# Patient Record
Sex: FEMALE
[De-identification: names, ages, dates, MRNs, and addresses within clinical notes are randomized per-mention and may not be internally consistent; named-entity substitution may affect disease eponyms.]

---

## 2018-07-11 ENCOUNTER — HOSPITAL ENCOUNTER (INPATIENT)
Dept: HOSPITAL 25 - SSU | Age: 78
LOS: 13 days | Discharge: NURSING, REHAB OR LONG-TERM CARE | DRG: 841 | End: 2018-07-24
Attending: INTERNAL MEDICINE | Admitting: INTERNAL MEDICINE
Payer: MEDICARE

## 2018-07-11 DIAGNOSIS — Z91.19: ICD-10-CM

## 2018-07-11 DIAGNOSIS — I48.91: ICD-10-CM

## 2018-07-11 DIAGNOSIS — R29.6: ICD-10-CM

## 2018-07-11 DIAGNOSIS — F05: ICD-10-CM

## 2018-07-11 DIAGNOSIS — E87.6: ICD-10-CM

## 2018-07-11 DIAGNOSIS — Z79.891: ICD-10-CM

## 2018-07-11 DIAGNOSIS — R09.02: ICD-10-CM

## 2018-07-11 DIAGNOSIS — Z88.8: ICD-10-CM

## 2018-07-11 DIAGNOSIS — R34: ICD-10-CM

## 2018-07-11 DIAGNOSIS — Z87.891: ICD-10-CM

## 2018-07-11 DIAGNOSIS — G93.89: ICD-10-CM

## 2018-07-11 DIAGNOSIS — S70.11XA: ICD-10-CM

## 2018-07-11 DIAGNOSIS — Z66: ICD-10-CM

## 2018-07-11 DIAGNOSIS — D49.89: ICD-10-CM
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
## 2018-07-11 DIAGNOSIS — Z86.73: ICD-10-CM

## 2018-07-11 DIAGNOSIS — F03.90: ICD-10-CM

## 2018-07-11 DIAGNOSIS — Z90.49: ICD-10-CM

## 2018-07-11 DIAGNOSIS — R59.0: ICD-10-CM

## 2018-07-11 DIAGNOSIS — Z98.51: ICD-10-CM

## 2018-07-11 DIAGNOSIS — M84.459A: ICD-10-CM

## 2018-07-11 DIAGNOSIS — M25.511: ICD-10-CM

## 2018-07-11 DIAGNOSIS — E87.70: ICD-10-CM

## 2018-07-11 DIAGNOSIS — Z72.89: ICD-10-CM

## 2018-07-11 DIAGNOSIS — C82.94: Primary | ICD-10-CM
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
## 2018-07-11 DIAGNOSIS — I10: ICD-10-CM

## 2018-07-11 DIAGNOSIS — E11.9: ICD-10-CM

## 2018-07-11 DIAGNOSIS — K59.00: ICD-10-CM

## 2018-07-11 DIAGNOSIS — E83.42: ICD-10-CM

## 2018-07-11 DIAGNOSIS — J44.9: ICD-10-CM

## 2018-07-11 DIAGNOSIS — E27.8: ICD-10-CM

## 2018-07-11 LAB
BASOPHILS # BLD AUTO: 0.1 10^3/UL (ref 0–0.2)
EOSINOPHIL # BLD AUTO: 0.1 10^3/UL (ref 0–0.6)
HCT VFR BLD AUTO: 38 % (ref 35–47)
HGB BLD-MCNC: 12.4 G/DL (ref 12–16)
INR PPP/BLD: 0.94 (ref 0.77–1.02)
LYMPHOCYTES # BLD AUTO: 0.5 10^3/UL (ref 1–4.8)
MCH RBC QN AUTO: 28 PG (ref 27–31)
MCHC RBC AUTO-ENTMCNC: 33 G/DL (ref 31–36)
MCV RBC AUTO: 84 FL (ref 80–97)
MONOCYTES # BLD AUTO: 0.8 10^3/UL (ref 0–0.8)
NEUTROPHILS # BLD AUTO: 10.2 10^3/UL (ref 1.5–7.7)
NRBC # BLD AUTO: 0 10^3/UL
NRBC BLD QL AUTO: 0
PLATELET # BLD AUTO: 316 10^3/UL (ref 150–450)
RBC # BLD AUTO: 4.51 10^6/UL (ref 4–5.4)
WBC # BLD AUTO: 11.7 10^3/UL (ref 3.5–10.8)

## 2018-07-11 PROCEDURE — 72170 X-RAY EXAM OF PELVIS: CPT

## 2018-07-11 PROCEDURE — 71260 CT THORAX DX C+: CPT

## 2018-07-11 PROCEDURE — 72192 CT PELVIS W/O DYE: CPT

## 2018-07-11 PROCEDURE — 70498 CT ANGIOGRAPHY NECK: CPT

## 2018-07-11 PROCEDURE — 81003 URINALYSIS AUTO W/O SCOPE: CPT

## 2018-07-11 PROCEDURE — 88188 FLOWCYTOMETRY/READ 9-15: CPT

## 2018-07-11 PROCEDURE — 80048 BASIC METABOLIC PNL TOTAL CA: CPT

## 2018-07-11 PROCEDURE — 70551 MRI BRAIN STEM W/O DYE: CPT

## 2018-07-11 PROCEDURE — 88184 FLOWCYTOMETRY/ TC 1 MARKER: CPT

## 2018-07-11 PROCEDURE — 87641 MR-STAPH DNA AMP PROBE: CPT

## 2018-07-11 PROCEDURE — 93005 ELECTROCARDIOGRAM TRACING: CPT

## 2018-07-11 PROCEDURE — 88189 FLOWCYTOMETRY/READ 16 & >: CPT

## 2018-07-11 PROCEDURE — 36415 COLL VENOUS BLD VENIPUNCTURE: CPT

## 2018-07-11 PROCEDURE — 88360 TUMOR IMMUNOHISTOCHEM/MANUAL: CPT

## 2018-07-11 PROCEDURE — 88173 CYTOPATH EVAL FNA REPORT: CPT

## 2018-07-11 PROCEDURE — 88185 FLOWCYTOMETRY/TC ADD-ON: CPT

## 2018-07-11 PROCEDURE — 71045 X-RAY EXAM CHEST 1 VIEW: CPT

## 2018-07-11 PROCEDURE — 80053 COMPREHEN METABOLIC PANEL: CPT

## 2018-07-11 PROCEDURE — 88187 FLOWCYTOMETRY/READ 2-8: CPT

## 2018-07-11 PROCEDURE — 74177 CT ABD & PELVIS W/CONTRAST: CPT

## 2018-07-11 PROCEDURE — 87040 BLOOD CULTURE FOR BACTERIA: CPT

## 2018-07-11 PROCEDURE — 85610 PROTHROMBIN TIME: CPT

## 2018-07-11 PROCEDURE — 85025 COMPLETE CBC W/AUTO DIFF WBC: CPT

## 2018-07-11 PROCEDURE — 88172 CYTP DX EVAL FNA 1ST EA SITE: CPT

## 2018-07-11 PROCEDURE — 70496 CT ANGIOGRAPHY HEAD: CPT

## 2018-07-11 PROCEDURE — 93306 TTE W/DOPPLER COMPLETE: CPT

## 2018-07-11 PROCEDURE — 83735 ASSAY OF MAGNESIUM: CPT

## 2018-07-11 PROCEDURE — 88305 TISSUE EXAM BY PATHOLOGIST: CPT

## 2018-07-11 PROCEDURE — 99223 1ST HOSP IP/OBS HIGH 75: CPT

## 2018-07-11 PROCEDURE — 99231 SBSQ HOSP IP/OBS SF/LOW 25: CPT

## 2018-07-11 RX ADMIN — DOCUSATE SODIUM SCH MG: 100 CAPSULE, LIQUID FILLED ORAL at 21:30

## 2018-07-11 RX ADMIN — KETOROLAC TROMETHAMINE PRN MG: 15 INJECTION, SOLUTION INTRAMUSCULAR; INTRAVENOUS at 17:49

## 2018-07-11 RX ADMIN — INSULIN LISPRO SCH UNIT: 100 INJECTION, SOLUTION INTRAVENOUS; SUBCUTANEOUS at 21:27

## 2018-07-11 RX ADMIN — MORPHINE SULFATE PRN MG: 4 INJECTION INTRAVENOUS at 23:01

## 2018-07-11 RX ADMIN — HYDROMORPHONE HYDROCHLORIDE PRN MG: 1 INJECTION, SOLUTION INTRAMUSCULAR; INTRAVENOUS; SUBCUTANEOUS at 23:24

## 2018-07-11 RX ADMIN — MORPHINE SULFATE PRN MG: 4 INJECTION INTRAVENOUS at 17:47

## 2018-07-11 RX ADMIN — MORPHINE SULFATE PRN MG: 4 INJECTION INTRAVENOUS at 20:24

## 2018-07-11 RX ADMIN — HEPARIN SODIUM SCH UNITS: 5000 INJECTION INTRAVENOUS; SUBCUTANEOUS at 21:28

## 2018-07-11 NOTE — RAD
Indication: Shortness of breath. RIGHT femoral neck fracture



Comparison: No relevant prior exams available on the [facility] PACS for comparison.



Technique: Supine portable chest 1726 hours



Report: Elevated lung volumes and both diffuse mild prominence of the interstitial

markings and patchy rarefaction of the mid to upper lung zone interstitial markings.  No

pulmonary infiltrate. No pleural effusions or pneumothorax evident within limits of supine

technique. The heart, pulmonary vasculature, and mediastinal contours are unremarkable.

Hand superimposed at the epigastric region. Surgical clips at the level of the gallbladder

fossa.



IMPRESSION: Stigmata of obstructive lung disease. No acute pulmonary or cardiac process

evident.

## 2018-07-11 NOTE — RAD
Indication: RIGHT hip pain. Question hip fracture.



Comparison: No relevant prior exams available on the [facility] [place] for comparison.



Technique: AP frog-leg pelvis. AP and crosstable lateral views RIGHT femur.



Report: The RIGHT hip is normally located. No cortical disruption or gross trabecular

irregularity evident to identify fracture. Assessment is limited as there is no AP view of

the femoral head and neck. The remainder of the RIGHT femur is negative for fracture. Bone

density appears decreased throughout. Moderate axial joint space narrowing and medial

subchondral sclerosis and cystic change at the RIGHT hip. 



IMPRESSION: 

#. Limiting assessment for fracture given absence of an AP view of the RIGHT femoral head

and neck. Consider repeat radiographic exam or CT for further assessment if deemed

appropriate.

## 2018-07-11 NOTE — HP
CC:  Dr. Pfeiffer *

 

ADMISSION HISTORY AND PHYSICAL:

 

DATE OF ADMISSION:  07/11/18

 

PATIENT OF:  Obi Jose MD

 

ATTENDING HOSPITALIST:  Dr. Jose.* (DICTATED BY RIMA PORTER)

 

CONSULTING ORTHOPEDIST:  Juana Pfeiffer MD

 

CHIEF COMPLAINT:  Right hip pain.

 

HISTORY OF PRESENT ILLNESS:  Mrs. Lay is a 78-year-old female who 
apparently has a past medical history significant for hypertension, diabetes 
mellitus, and COPD, who was transferred from [facility] to [facility] earlier today after she sustained a fall at her residence at the 
nursing home.  The patient apparently sustained multiple falls where she 
resides at [facility].  I have on record two 
different pelvis 

x-rays dated from late May and most recent one yesterday that revealed a 
displaced right femoral fracture.  The patient unfortunately has been suffering 
from a longstanding history of dementia and she could not provide me with any 
significant history at this time.  I have tried to contact her healthcare proxy 
carrier, her daughter, Asya.  Unfortunately, 

I have not been able to get a hold of her over the phone.  According to the 
record I have from [facility], the patient was evaluated due to 
significant complaints of right hip pain after she sustained a fall yesterday 
morning.  She was evaluated and had hip x-ray that revealed displaced femoral 
neck fracture.  The patient was evaluated at [facility] initially and 
then contact was made to [facility] provider, who accepted the transfer of the patient 
in anticipation for probable orthopedic intervention to fix her broken hip.  
She appears to be in pain upon presentation and unfortunately does not provide 
me with any other significant medical history.  She denies any chest pain or 
shortness of breath.

 

PAST MEDICAL HISTORY:  Significant for hypertension, insulin-dependent diabetes 
mellitus, and remote history of COPD, which apparently she has not been using 
any nebulizer due to prior allergy of ALBUTEROL.  She also has history of 
advanced dementia and TIA long time ago.

 

PAST SURGICAL HISTORY:  Significant for tubal ligation and cholecystectomy.

 

CURRENT MEDICATIONS:  According to [facility] records, her medication include:

1.  Acetaminophen 650 mg p.o. q.6 hours as needed for fever or pain.

2.  Norvasc 10 mg p.o. daily.

3.  Dulcolax 10 mg suppository once per rectum daily as needed for constipation.

4.  Insulin regular.

5.  Humalog per sliding scale q.a.c. and q.h.s.

6.  Toradol 15 mg injection IM q. day as needed for pain.

7.  Melatonin 1 mg p.o. q.h.s. as needed for insomnia.

8.  Metformin 750 mg p.o. daily.

9.  Milk of magnesia 15 mL p.o. p.r.n. for constipation.

10.  Norco 5/325 one tablet p.o. q.4 hours as needed for pain.

 

ALLERGIES:  According to record, she is allergic to ALBUTEROL.

 

SOCIAL HISTORY:  The patient was living independently at [facility] until 
her recent admission to [facility]. She is a former 
smoker who smoked for many decades and quit a long time.  She drinks alcohol 
occasionally.  Again, per nursing home record, her healthcare proxy is her 
daughter, Asya, and her home phone number is area code [phone], [phone].

 

REVIEW OF SYSTEMS:  Unable to obtain due to advanced dementia and very minimal 
verbal contact.

 

                               PHYSICAL EXAMINATION

 

GENERAL:  She is a frail, elderly female, appears uncomfortable with complaints 
of right hip pain, but in no acute distress at the time of admission.

 

VITAL SIGNS:  There is no set of vitals available at the time of admission, but 
we will follow her up closely once stable.

 

HEENT:  Head is normocephalic, atraumatic.  Sclerae anicteric.  PERRLA.  EOMs 
intact.  Oropharynx is pink and moist.

 

NECK:  Supple.  Trachea midline.  No cervical adenopathy, thyromegaly, or JVD.

 

LUNGS:  Clear to auscultation bilaterally.

 

HEART:  Regular rate and rhythm.  Normal S1 and S2 without rubs, murmurs, or 
gallops.

 

BACK:  With normal curvature and no CVA tenderness.

 

ABDOMEN:  Soft, nontender, and nondistended.  No hernias, masses, or 
hepatosplenomegaly.

 

BREAST EXAM:  Deferred at this time.

 

EXTREMITIES:  Without cyanosis, clubbing, or edema.  Examination of the right 
lower extremity revealed a slightly shorter lower extremity on the right side 
that appears to be internally rotated as well.  There is a significant 
tenderness on minimal palpation to the greater trochanteric area.  There is no 
visible ecchymosis or swelling noted.  Right shoulder with an area of 
ecchymosis on top of humeral head and AC joint.  There is minimal tenderness on 
palpation and no sensation of bony injuries.

 

RECTAL:  Exam deferred at this time.

 

NEUROLOGIC:  She is awake and alert at times, also appears drowsy at other 
times. She is not oriented to place, time, but occasionally oriented to self.

 

 LABORATORY WORKUP:  CBC, CMP, PT, INR, and UA is pending at the time of 
admission.

 

ACCESSORY DIAGNOSTIC DATA:  Pelvic x-rays, right hip x-rays, portable chest as 
well as EKG are all pending at the time of admission.

 

IMPRESSION:  A 78-year-old female with past medical history of hypertension, 
COPD, and insulin-dependent diabetes mellitus, who sustained multiple falls at 
her residence at [facility] most recently yesterday, 
who was found to have a displaced right femoral neck fracture.

 

ASSESSMENT AND PLAN:

1.  Right femoral neck fracture.  The patient will be admitted under 
hospitalist services to surgical stay unit for pain control.  She will be seen 
by Dr. Pfeiffer for Orthopedic consultation.  We will do her workup regarding 
labs and accessory diagnostic data as well as probable echocardiogram in the 
morning for further assessment in anticipation to surgical intervention in the 
near future.  I will attempt again to call her daughter regarding plans of care 
since the patient has not given me any clear answer whether she wants to 
proceed with any intervention at this time or not.

2.  Hypertension.  We will continue her amlodipine.

3.  Chronic obstructive pulmonary disease.  Appears stable and good oxygen 
saturation.  We will continue to monitor.

4.  Insulin-dependent diabetes mellitus.  We will continue to cover her with 
sliding scale with fingersticks q.a.c. and q.h.s.

5.  DVT prophylaxis.  She is at highest risk given her age and immobility.  We 
will cover with subcu heparin as well as SCDs.

6.  Code status:  For the time being and according to record, she is a full 
code and we will visit this issue again once I get in touch with her daughter.

 

 ____________________________________ RIMA PORTER

 

[id number]/CPS #: [id number]

[email or web address]

## 2018-07-11 NOTE — PN
Progress Note





- Progress Note


Date of Service: 07/11/18


Note: 





Full note dictated in system prior to obtaining CT scan. The CT demonstrates a 

displaced femoral neck fracture. This would most appropriately be treated by a 

natalya arthroplasty. Will discuss with my colleagues to help facilitate surgery 

when she is medically optimized. NPO after midnight.

## 2018-07-11 NOTE — RAD
Indication: RIGHT hip pain. Question hip fracture.



Comparison: No relevant prior exams available on the [facility] PACS for comparison.



Technique: AP frog-leg pelvis. AP and crosstable lateral views RIGHT femur.



Report: The RIGHT hip is normally located. No cortical disruption or gross trabecular

irregularity evident to identify fracture. Assessment is limited as there is no AP view of

the femoral head and neck. The remainder of the RIGHT femur is negative for fracture. Bone

density appears decreased throughout. Moderate axial joint space narrowing and medial

subchondral sclerosis and cystic change at the RIGHT hip. 



IMPRESSION: 

#. Limiting assessment for fracture given absence of an AP view of the RIGHT femoral head

and neck. Consider repeat radiographic exam or CT for further assessment if deemed

appropriate.

## 2018-07-12 LAB
BASOPHILS # BLD AUTO: 0 10^3/UL (ref 0–0.2)
EOSINOPHIL # BLD AUTO: 0 10^3/UL (ref 0–0.6)
HCT VFR BLD AUTO: 36 % (ref 35–47)
HGB BLD-MCNC: 11.8 G/DL (ref 12–16)
INR PPP/BLD: 0.94 (ref 0.77–1.02)
LYMPHOCYTES # BLD AUTO: 0.5 10^3/UL (ref 1–4.8)
MCH RBC QN AUTO: 28 PG (ref 27–31)
MCHC RBC AUTO-ENTMCNC: 33 G/DL (ref 31–36)
MCV RBC AUTO: 84 FL (ref 80–97)
MONOCYTES # BLD AUTO: 0.7 10^3/UL (ref 0–0.8)
NEUTROPHILS # BLD AUTO: 8.6 10^3/UL (ref 1.5–7.7)
NRBC # BLD AUTO: 0 10^3/UL
NRBC BLD QL AUTO: 0.1
PLATELET # BLD AUTO: 319 10^3/UL (ref 150–450)
RBC # BLD AUTO: 4.28 10^6/UL (ref 4–5.4)
WBC # BLD AUTO: 9.9 10^3/UL (ref 3.5–10.8)

## 2018-07-12 RX ADMIN — DOCUSATE SODIUM SCH: 100 CAPSULE, LIQUID FILLED ORAL at 07:12

## 2018-07-12 RX ADMIN — SODIUM CHLORIDE SCH MLS/HR: 900 IRRIGANT IRRIGATION at 10:12

## 2018-07-12 RX ADMIN — INSULIN LISPRO SCH: 100 INJECTION, SOLUTION INTRAVENOUS; SUBCUTANEOUS at 12:37

## 2018-07-12 RX ADMIN — INSULIN LISPRO SCH: 100 INJECTION, SOLUTION INTRAVENOUS; SUBCUTANEOUS at 07:27

## 2018-07-12 RX ADMIN — HYDROMORPHONE HYDROCHLORIDE PRN MG: 1 INJECTION, SOLUTION INTRAMUSCULAR; INTRAVENOUS; SUBCUTANEOUS at 10:59

## 2018-07-12 RX ADMIN — INSULIN LISPRO SCH: 100 INJECTION, SOLUTION INTRAVENOUS; SUBCUTANEOUS at 23:58

## 2018-07-12 RX ADMIN — AMLODIPINE BESYLATE SCH: 5 TABLET ORAL at 08:26

## 2018-07-12 RX ADMIN — INSULIN LISPRO SCH: 100 INJECTION, SOLUTION INTRAVENOUS; SUBCUTANEOUS at 16:26

## 2018-07-12 RX ADMIN — HEPARIN SODIUM SCH: 5000 INJECTION INTRAVENOUS; SUBCUTANEOUS at 06:18

## 2018-07-12 RX ADMIN — DOCUSATE SODIUM SCH: 100 CAPSULE, LIQUID FILLED ORAL at 21:16

## 2018-07-12 RX ADMIN — INSULIN LISPRO SCH: 100 INJECTION, SOLUTION INTRAVENOUS; SUBCUTANEOUS at 17:50

## 2018-07-12 RX ADMIN — HYDROMORPHONE HYDROCHLORIDE PRN MG: 1 INJECTION, SOLUTION INTRAMUSCULAR; INTRAVENOUS; SUBCUTANEOUS at 21:20

## 2018-07-12 RX ADMIN — HYDROMORPHONE HYDROCHLORIDE PRN MG: 1 INJECTION, SOLUTION INTRAMUSCULAR; INTRAVENOUS; SUBCUTANEOUS at 17:56

## 2018-07-12 NOTE — RAD
Indication: Worsening confusion and weakness. Previous CVAs with residual RIGHT-sided

weakness.



Comparison: CT and CTA head neck exams of the same date.



Technique: [employer] 1.5 Hallie MN819E with GEM suite.  MRI brain without contrast. 



Report: Diffusion series is negative for acute or subacute ischemia. Susceptibility series

is negative for stigmata of hemosiderin deposition to indicate previous hemorrhage.



Mild prominence of the cerebral sulci and cerebellar fissures collecting atrophy.

Proportional ventricular enlargement. Patent basal cisterns. Disproportionate mild volume

loss and T2 hyperintense ischemic gliosis at the LEFT middle cerebral artery distribution

of the frontal and parietal lobes consistent with sequela of previous infarct. Additional

few nonspecific T2 hyperintense foci within the cerebral white matter without mass effect.

No extra-axial fluid collection evident. Preserved major intracranial flow-voids.

Unremarkable orbital contents.



No suspicious calvarial or skull base lesions evident. Mucous retention cyst or polyp at

the inferior LEFT maxillary sinus. Unremarkable scalp. 



IMPRESSION: 

#. Negative for stigmata of acute or subacute ischemia. 

#. Relative mild encephalomalacia related to old LEFT MCA distribution infarct involving

the posterior LEFT frontal lobe and parietal lobe. 

#. Mild diffuse atrophy and stigmata of chronic small vessel ischemic disease.

## 2018-07-12 NOTE — RAD
INDICATION: RIGHT femoral neck fracture. Assess for tumor.



COMPARISON: July 11, 2018 noncontrast CT pelvis.



TECHNIQUE: Multidetector CT images were obtained from the lung apices to the ischial

tuberosities with 64 mL Visipaque 320 IV contrast. No oral contrast administered.



CHEST REPORT: Advanced emphysema. Small dependent RIGHT and trace dependent LEFT pleural

effusions with proportional atelectasis. Negative for pneumothorax.



LEFT axillary lymphadenopathy with coalescent janis mass measuring up to 3.2 x 5.2 cm.

Negative for mediastinal or hilar lymphadenopathy. Negative for cardiomegaly or

pericardial effusion. Atherosclerotic plaque of the normal diameter thoracic aorta.

Negative for arterial dissection.



Ill-defined lobular soft tissue hematoma involving the RIGHT trapezius muscle at the

superior posterior margin of the shoulder and probable additional tumor involving the

RIGHT infraspinatus and teres minor muscles.



Ill-defined sclerotic lesion at the T5 vertebral body. Similar ill-defined sclerotic

lesion at the T10 vertebral body at the RIGHT margin. Osteoblastic lesion at the LEFT

third rib laterally.



CHEST IMPRESSION: 

#. Small RIGHT and trace LEFT pleural effusions with proportional atelectasis. 

#. LEFT axillary lymphadenopathy. 

#.  Ill-defined lobular soft tissue hematoma involving the RIGHT trapezius muscle at the

superior posterior margin of the shoulder and probable additional tumor involving the

RIGHT infraspinatus and teres minor muscles. 

#. T5, T10, and LEFT third rib osteoblastic lesions.



ABDOMEN PELVIS REPORT: Post cholecystectomy. Mild intrahepatic biliary dilatation.

Negative for dilatation of the common bile duct. Negative for focal hepatic lesions.

Atrophic pancreas. Negative for pancreatic duct dilatation. Unremarkable spleen.



No CT abnormality of the upper GI or small bowel. The appendix is not visualized.

Unremarkable colon. Gas distention of the rectum. Small volume of free fluid in the

cul-de-sac. Negative for free air or hernias.



3.1 x 2.1 cm soft tissue density RIGHT adrenal mass. Negative for focal LEFT adrenal

lesions. Negative for suspicious focal renal lesions or hydronephrosis. Small cortical

cyst at the inferior pole of the LEFT kidney. The ureters are obscured due to

retroperitoneal lymphadenopathy. No ureteral dilatation evident. Catheterized largely

decompressed urinary bladder without suspicious finding. Anteverted uterus. 3 cm mildly

hyperdense lesion at the fundus of the uterus most suspicious for a fibroid. No ovarian

lesions evident.



Extensive retroperitoneal lymphadenopathy primarily LEFT para-aortic and external iliac

with coalescent adenopathy inseparable from an enlarged LEFT iliopsoas muscle. Coalescent

janis mass and LEFT psoas muscle measures up to 6.4 cm AP by 5.6 cm transverse at the

pelvic inlet compared with 2.9 x 2.5 cm for the normal-appearing RIGHT psoas muscle.

Dominant RIGHT common femoral lymph node measures 2 cm short axis.



Extensive atherosclerotic plaque at the abdominal aorta and iliac arteries. Borderline

mild fusiform aneurysm of the infrarenal abdominal aorta measuring up to 2.3 cm diameter

compared with the more proximal segment of the abdominal aorta which measures only 1.3 cm

diameter.



Subcapital RIGHT femoral neck fracture with apex anterior and varus angulation.

Osteoblastic lesion at the RIGHT ischial tuberosity with flanking soft tissue density mass

extending both medially and laterally from the ischium.



ABDOMEN PELVIS IMPRESSION: 

#. Nonspecific 3.1 cm RIGHT adrenal mass suspicious for metastasis. 

#. Negative for obstructive uropathy. 

#. Extensive retroperitoneal lymphadenopathy as described. 

#. 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 

#. Subcapital RIGHT femoral neck fracture with apex anterior and varus angulation.

Osteoblastic lesion at the RIGHT ischial tuberosity with flanking soft tissue density mass

extending both medially and laterally from the ischium. 

#. Consider fine-needle aspiration of the dominant RIGHT common femoral level lymph node

for histopathologic assessment.

## 2018-07-12 NOTE — CONS
CC:  PCP, Héctor Dumas MD *

 

CONSULTATION REPORT:

 

DATE OF CONSULT:  07/11/18

 

CHIEF COMPLAINT:  Right hip pain.

 

HISTORY OF PRESENT ILLNESS:  Briefly, this H and P is obtained from [facility] admission history and physical from 05/24/18.  Briefly
, Cici Lay is a [facility] resident with advanced 
dementia, who presents with concerns for right hip fracture.  She was 
transferred here.  We have no imaging, no history other than this document.  
She is nonverbal and noncompliant.  She is having difficulty with ambulation 
and altered mental status.  I do not have any documents to tell me if she 
ambulates with an assistive device.  She has family, apparently is not able to 
be reached today.  She has right hip pain and has pain with moving the hip.  
She keeps it flexed and does not move it.  She is very demented and confused.

 

PAST MEDICAL HISTORY:  Significant for diabetes, hypertension, COPD, history of 
TIA, history of smoking.

 

Per this note, she has refused any efforts for smoking cessation, refused 
medical screening, preventative screenings, any lab testing.  She obtains her 
medications from her physician, that is the only reason why she returns to the 
doctor.  She has recent admission for altered mental status on 05/24/18.  At 
that time, she was living independently, then they placed her in [facility].

 

PAST SURGICAL HISTORY:  Significant for tubal ligation, history of 
cholecystectomy.

 

MEDICATIONS:  According to this note, her medications are:

1.  Insulin.

2.  Humalog.

3.  Toradol.

4.  Melatonin.

5.  Metformin.

6.  Milk of magnesia.

7.  Norco.

8.  Norvasc.

9.  Dulcolax.

10.  Acetaminophen.

 

ALLERGIES:  To ALBUTEROL.

 

FAMILY HISTORY:  Noncontributory and unobtainable.

 

SOCIAL HISTORY:  She is now a resident of [facility].  She was a 
smoker for many decades and drinks alcohol on occasion.  I do not have any 
information as to whether she ambulates with assistive devices.  She is 
currently lying in the bed.

 

REVIEW OF SYSTEMS:  Unable to be obtained.  She does not document.  She does 
not correspond with me.  She does not acknowledge my exam.  She does endorse 
pain and shouts with motion of the leg.

 

She is not talking to us and she has advanced dementia.

 

PHYSICAL EXAM:  The patient was examined at approximately 5:20 p.m.

 

She is not speaking to me.

 

She is frail, appears uncomfortable with the right hip, does not move it.  She 
spontaneously moves both her upper extremities and her left leg.  Vitals were 
not available at the time of my seeing her, but my most recent set of vitals 
are temperature 98.5, pulse 79, respiratory rate 20, O2 saturation 97%, blood 
pressure 133/60.  EOMI.  Chest is clear to auscultation.  Heart is regular rate 
and rhythm. Abdomen is soft and nontender.  Examination of her right leg 
demonstrates she is sitting in the frog-legged position and will not move her 
right hip.  She is flexed and externally rotated.  Examination of the left leg 
demonstrates she is able to flex and extend her hip with spontaneous 
difficulty.  She does not endorse sensation to light touch grossly distally on 
the right side and she will spontaneously flex and extend her digits.  Her 
digits, she has brisk cap refill, she had 2+ PT pulse.  Her calf is soft and 
compressible.

 

DIAGNOSTIC STUDIES/LAB DATA:  White blood cell count 11.7, hematocrit of 38, 
platelet count of 316, INR of 0.94.  Chemistry:  134, potassium 4.5, chloride 96
, carbon dioxide 29, BUN 16, creatinine 0.4.  Urine is negative for infection, 
but positive for glucose.  X-rays were obtained that are very difficult to 
assess due to the patient not moving her hip.  The right hip femoral neck is 
not easy to be evaluated.  There appears to be no injury to the left femoral 
neck.  Imaging are limited due to patient's inability to comply with x-rays.  
Images are of very poor quality and these are of the AP pelvis, likely it 
sounds impacted femoral neck.

 

ASSESSMENT AND PLAN:  This is a tricky situation.  I saw the patient at 5:30 
and x- rays were not done until approximately 7:30.  We do not see obvious 
fracture, but she has some injury to her hip, likely she has a displaced 
femoral neck fracture. The family was unable to be reached at the time of 
presentation to [facility].  At this point, they do not have enough history and she 
needs to get medical optimization. I have asked for a CT scan of the pelvis to 
better delineate the fracture and I have ordered that stat.  I will discuss the 
case with one of my partners to treat her surgically once we determine exactly 
what surgery is appropriate for her.  She needs to be optimized medically prior 
to this happening and we need more information.

 

 [id number]/CPS #: [id number]

VLAD

## 2018-07-12 NOTE — ECHO
Patient:      ROXANA KNIGHT

[facility] Rec#:     [id number]            :          1940          

Date:         2018            Age:          78y                 

Account#:     [id number]          Height:       160 cm / 63.0 in

Accession#:   [id number]           Weight:       51.3 kg / 113.1 lbs

Sex:          F                     BSA:          1.5

Room#:        336                   

Admit Date#:  2018          

Type:         Inpatient

 

Referring:    Demetra Suárez

Reading:      Sajan Wood MD

Sonographer:  Monica Hill RN RDCS

CC:           Héctor Dumas MD

______________________________________________________________________

 

Transthoracic Echocardiogram

 

Indication:

Hypertension

BP:           131/48

HR:           73

Rhythm:       NSR with PACs

 

Findings     

History:

HTN, DM, COPD, former smoker, TIA, dementia 

 

Technical Comments:

The study quality is fair.  The study is technically limited due to the

patient's history of COPD.  The study was technically limited due to the

patient's inability to lay in the left lateral decubitus position.

Completed at 1115. 

 

Left Ventricle:

The left ventricular chamber size is normal. Mild concentric left

ventricular hypertrophy is observed. There is increased basal septal

hypertrophy noted without evidence of an increased gradient across the

left ventricular outflow tract.  Global left ventricular wall motion and

contractility are within normal limits. There is normal left ventricular

systolic function. The estimated ejection fraction is 50-55%.  There is

an E to A reversal in the mitral valve flow pattern suggestive of

diastolic dysfunction. 

 

Left Atrium:

The left atrial chamber size is normal. 

 

Right Ventricle:

The right ventricle wall thickness is mildly increased. The right

ventricular cavity size is normal. The right ventricular global systolic

function is normal. 

 

Right Atrium:

The right atrial cavity size is normal. 

 

Aortic Valve:

The aortic valve is trileaflet. The aortic valve leaflets are mildly

thickened. There is mild to moderate aortic regurgitation.  There is no

evidence of aortic stenosis. 

 

Mitral Valve:

The mitral valve leaflets are mildly thickened. There is mild to

moderate mitral regurgitation.  

 

Tricuspid Valve:

The tricuspid valve leaflets are normal.  There is mild to moderate

tricuspid regurgitation. There is evidence of mild to moderate pulmonary

hypertension. 

 

Pulmonic Valve:

The pulmonic valve structure is not well visualized. There is no

evidence of pulmonic regurgitation. There is no pulmonic stenosis.  

 

Pericardium:

There is no significant pericardial effusion. 

 

Aorta:

The ascending aorta is not well visualized. There is no dilatation of

the aortic arch. The aortic root is normal in size. 

 

Pulmonary Artery:

The main pulmonary artery is not well visualized. 

 

Venous:

The inferior vena cava appears normal in size. There is an approximate

50% respiratory change in the inferior vena cava dimension. 

 

Conclusions

There is normal left ventricular systolic function.

The estimated ejection fraction is 50-55%. 

Global left ventricular wall motion and contractility are within normal

limits.

Mild concentric left ventricular hypertrophy is observed.

There is an E to A reversal in the mitral valve flow pattern suggestive

of diastolic dysfunction.

There is mild to moderate aortic regurgitation. 

There is mild to moderate mitral regurgitation. 

There is mild to moderate tricuspid regurgitation.

There is evidence of mild to moderate pulmonary hypertension.

There is no prior echocardiogram available to compare with at this time.

 

 

Measurements     

Name                    Value         Normal Range            

RVIDd (AP) 2D           2.2 cm        (0.9 - 2.6)             

RVDdMajor (2D)          3 cm          (2.2 - 4.4)             

RVAW (2D)               0.8 cm        (0.2 - 0.5)             

RAd ISD 4CH             4.7 cm        (3.4 - 4.9)             

RA (A4C)W               3.4 cm        (2.9 - 4.6)             

IVSd (2D)               1.3 cm        (0.6 - 1)               

LVPWd (2D)              1.2 cm        (0.6 - 1)               

LVIDd (2D)              3.9 cm        (3.6 - 5.4)             

LVIDs (2D)              2.8 cm        -                        

LV FS (2D)              28 %          (25 - 45)               

Aortic Annulus          2.1 cm        (1.4 - 2.6)             

Ao root diameter (2D)   3 cm          (2.1 - 3.5)             

Aortic arch             2.4 cm        (1.8 - 3.4)             

LA dimension (AP) 2D    3.2 cm        (2.3 - 3.8)             

LAd ISD 4CH             4.7 cm        (2.9 - 5.3)             

LA ISD 4CH W            4.3 cm        (2.5 - 4.5)             

 

Name                    Value         Normal Range            

LA ESV SP 4CH (A/L)     55 ml         -                        

LA ESV SP 2CH (A/L)     29 ml         -                        

LA ESV BP (A/L)         41 ml         -                        

LA ESV BP (A/L) index   27 ml/m2      -                        

LA ESV SP 4CH (MOD)     48 ml         -                        

LA ESV SP 2CH (MOD)     28 ml         -                        

 

Name                    Value         Normal Range            

MV E-wave Vmax          0.79 m/sec    -                        

MV deceleration time    234 msec      -                        

MV A-wave Vmax          1 m/sec       -                        

MV E:A ratio            0.79 ratio    -                        

LV septal e' Vmax       0.08 m/sec    -                        

LV lateral e' Vmax      0.1 m/sec     -                        

LV E:e' septal ratio    9.9 ratio     -                        

LV E:e' lateral ratio   7.9 ratio     -                        

 

Name                    Value         Normal Range            

AV Vmax                 1.4 m/sec     -                        

AV VTI                  33.9 cm       -                        

AV peak gradient        8.3 mmHg      -                        

AV mean gradient        4.3 mmHg      -                        

LVOT Vmax               1.1 m/sec     -                        

LVOT VTI                22.7 cm       -                        

LVOT peak gradient      4.8 mmHg      -                        

LVOT mean gradient      2.4 mmHg      -                        

AR PHT                  432 msec      -                        

LEIGHTON Vmax                0.59 m/sec    -                        

 

Name                    Value         Normal Range            

TR Vmax                 3.1 m/sec     -                        

TR peak gradient        38 mmHg       -                        

RAP                     8 mmHg        -                        

RVSP                    46 mmHg       -                        

IVC diameter            1.6 cm        -                        

 

Name                    Value         Normal Range            

PV Vmax                 1.2 m/sec     -                        

 

Electronically signed by: Sajan Wood MD on 2018 11:41:40

## 2018-07-12 NOTE — RAD
HISTORY: CVA



COMPARISONS: None



TECHNIQUE: Multiple contiguous axial CT scans were obtained of the head, before and after,

and of the neck after the administration of nonionic intravenous contrast timed to the

systemic arterial phase of contrast enhancement. Coronal and sagittal multiplanar

reformations are submitted for review. Multiple 3-D maximum intensity projection

reconstructions are also submitted for review.



FINDINGS:

The study



CTA NECK:



AORTIC ARCH: There is calcific atherosclerotic disease of the aortic arch, without ostial

or proximal stenosis of the cephalic great vessels. There is a normal three-vessel

branching pattern.



RIGHT VERTEBRAL ARTERY: The right vertebral artery is patent along its course, without

stenosis.

LEFT VERTEBRAL ARTERY: The left vertebral artery is patent along its course, without

stenosis.

DOMINANCE: The vertebral arteries are codominant.





RIGHT COMMON CAROTID ARTERY: The right common carotid artery is patent. The right carotid

bifurcation occurs at C4-C5

RIGHT INTERNAL CAROTID ARTERY: There is atheromatous disease of the right carotid

bifurcation, without right internal carotid artery stenosis by NASCET criteria. 

RIGHT EXTERNAL CAROTID ARTERY: The right external carotid artery is unremarkable.



LEFT COMMON CAROTID ARTERY: The left common carotid artery is patent. The left carotid

bifurcation occurs at C3-C4

LEFT INTERNAL CAROTID ARTERY: There is atheromatous disease of the left carotid

bifurcation, without left internal carotid artery stenosis by NASCET criteria. 

LEFT EXTERNAL CAROTID ARTERY: The left external carotid artery is unremarkable.



VENOUS CIRCULATION: The venous system is unremarkable.



SALIVARY GLANDS: The parotid glands, submandibular glands, sublingual glands are normal.



NASAL CAVITY/NASOPHARYNX: The nasal cavity and nasopharynx are normal.



ORAL CAVITY/OROPHARYNX: The oral cavity and oropharynx are unremarkable.

LARYNGEAL APPARATUS/HYPOPHARYNX: The laryngeal apparatus and hypopharynx are normal.



UPPER AIRWAY/UPPER ESOPHAGUS: The visualized upper airway and esophagus are normal.

LUNG APICES: The lung apices are clear.



THYROID GLAND: The thyroid gland is heterogeneous.



LYMPH NODES: There is biapical emphysematous change.



BONES AND SOFT TISSUES: No bone or soft tissue abnormalities are noted.





CTA HEAD:



INTRACRANIAL CIRCULATION: There is no aneurysm, vascular malformation, occlusion, or

stenosis of the visualized intracranial circulation. The anterior communicating artery

complex is clear. Bilateral posterior communicating arteries are identified.



VENOUS CIRCULATION: The venous system is unremarkable.



PERFUSION: There is no obvious parenchymal perfusion deficit.



HEMORRHAGE/INFARCT: There is no hemorrhage or acute infarct.

MASSES/SHIFT: There is no mass or shift.

EXTRA-AXIAL SPACES: There are no extra-axial fluid collections.

SULCI AND VENTRICLES: The sulci and ventricles are normal in size and position for the

patient's stated age.



CEREBRUM: There is hypoattenuation of the periventricular and subcortical white matter. 

BRAINSTEM: There are no focal parenchymal abnormalities.

CEREBELLUM: There are no focal parenchymal abnormalities.



PARANASAL SINUSES: The paranasal sinuses are clear.

ORBITS: The orbits are unremarkable.

BONES AND SOFT TISSUE: Degenerative changes are noted of the spine. There are multiple

lobulated masses of the right shoulder musculature, and evaluated on the current

examination.



OTHER: There is no abnormal enhancement.





IMPRESSION: 

1.  NO INTERNAL CAROTID ARTERY STENOSIS BY NASCET CRITERIA.

2.  NO ANEURYSM, VASCULAR MALFORMATION, OCCLUSION, OR STENOSIS OF THE VISUALIZED

INTRACRANIAL CIRCULATION..

3.  ATHEROSCLEROSIS.

4.  PROXIMAL VESSEL ISCHEMIC CHANGE.

5.  NO ACUTE INTRACRANIAL PATHOLOGY.

6.  MULTIPLE LOBULATED MASSES OF THE MUSCULATURE OF THE RIGHT SHOULDER GIRDLE, EVALUATED

ON THE CURRENT EXAMINATION SUSPICIOUS FOR SARCOMA VERSUS METASTATIC DISEASE.



CPT II Codes: 3100F

## 2018-07-12 NOTE — PN
Progress Note





- Progress Note


Date of Service: 07/12/18


SOAP: 


Subjective:


[]


Patient seen at bedside. She is nonverbal. Her daughter Tracey was present and 

able to provide greater detail of medical history. Per Tracey patient's current 

mental status is much worse than usual. She does typically carry on conversation

, appropriately, with confusion as to location. She has "slowed down 

"significantly since May when she began experiencing weakness and frequent 

falls. At this time she transitioned from independent living at home, walking 

with a walker and no home health support to living at [facility]. She has had 

two strokes in the past, most recently in 2001 resulting in right sided 

weakness which has improved over time.  Patient did tolerate surgery well in 

the remote past with no family history of adverse effects of anesthesia.





Objective:


[]


 Vital Signs











Temp  99.9 F   07/12/18 11:42


 


Pulse  81   07/12/18 11:16


 


Resp  14   07/12/18 11:16


 


BP  152/54   07/12/18 11:16


 


Pulse Ox  98   07/12/18 11:16








 Intake & Output











 07/11/18 07/12/18 07/12/18





 18:59 06:59 18:59


 


Intake Total  0 1786


 


Output Total  350 


 


Balance  -350 1786


 


Weight 113 lb 9.6 oz  


 


Intake:   


 


  IV Fluids   1786


 


    NS (0.9%)   1786


 


  Oral  0 0


 


Output:   


 


  Casey  350 


 


Other:   


 


  Estimated Void Medium  








 Laboratory Last Values











WBC  9.9 10^3/ul (3.5-10.8)   07/12/18  10:50    


 


RBC  4.28 10^6/ul (4.00-5.40)   07/12/18  10:50    


 


Hgb  11.8 g/dl (12.0-16.0)  L  07/12/18  10:50    


 


Hct  36 % (35-47)   07/12/18  10:50    


 


MCV  84 fL (80-97)   07/12/18  10:50    


 


MCH  28 pg (27-31)   07/12/18  10:50    


 


MCHC  33 g/dl (31-36)   07/12/18  10:50    


 


RDW  16 % (10.5-15)  H  07/12/18  10:50    


 


Plt Count  319 10^3/ul (150-450)   07/12/18  10:50    


 


MPV  7.4 um3 (7.4-10.4)   07/12/18  10:50    


 


Neut % (Auto)  86.9 % (38-83)  H  07/12/18  10:50    


 


Lymph % (Auto)  5.4 % (25-47)  L  07/12/18  10:50    


 


Mono % (Auto)  6.8 % (0-7)   07/12/18  10:50    


 


Eos % (Auto)  0.4 % (0-6)   07/12/18  10:50    


 


Baso % (Auto)  0.5 % (0-2)   07/12/18  10:50    


 


Absolute Neuts (auto)  8.6 10^3/ul (1.5-7.7)  H  07/12/18  10:50    


 


Absolute Lymphs (auto)  0.5 10^3/ul (1.0-4.8)  L  07/12/18  10:50    


 


Absolute Monos (auto)  0.7 10^3/ul (0-0.8)   07/12/18  10:50    


 


Absolute Eos (auto)  0 10^3/ul (0-0.6)   07/12/18  10:50    


 


Absolute Basos (auto)  0 10^3/ul (0-0.2)   07/12/18  10:50    


 


Absolute Nucleated RBC  0 10^3/ul  07/12/18  10:50    


 


Nucleated RBC %  0.1   07/12/18  10:50    


 


INR (Anticoag Therapy)  0.94  (0.77-1.02)   07/12/18  10:50    


 


Sodium  136 mmol/L (135-145)   07/12/18  10:50    


 


Potassium  4.1 mmol/L (3.5-5.0)   07/12/18  10:50    


 


Chloride  99 mmol/L (101-111)  L  07/12/18  10:50    


 


Carbon Dioxide  30 mmol/L (22-32)   07/12/18  10:50    


 


Anion Gap  7 mmol/L (2-11)   07/12/18  10:50    


 


BUN  11 mg/dL (6-24)   07/12/18  10:50    


 


Creatinine  0.35 mg/dL (0.51-0.95)  L  07/12/18  10:50    


 


Est GFR ( Amer)  217.9  (>60)   07/12/18  10:50    


 


Est GFR (Non-Af Amer)  180.1  (>60)   07/12/18  10:50    


 


BUN/Creatinine Ratio  31.4  (8-20)  H  07/12/18  10:50    


 


Glucose  101 mg/dL ()  H  07/12/18  10:50    


 


POC Glucose (mg/dL)  89 mg/dL ()   07/12/18  07:26    


 


Calcium  8.5 mg/dL (8.6-10.3)  L  07/12/18  10:50    


 


Total Bilirubin  0.50 mg/dL (0.2-1.0)   07/11/18  18:17    


 


AST  44 U/L (13-39)  H  07/11/18  18:17    


 


ALT  28 U/L (7-52)   07/11/18  18:17    


 


Alkaline Phosphatase  229 U/L ()  H  07/11/18  18:17    


 


Total Protein  5.4 g/dL (6.4-8.9)  L  07/11/18  18:17    


 


Albumin  2.8 g/dL (3.2-5.2)  L  07/11/18  18:17    


 


Globulin  2.6 g/dL (2-4)   07/11/18  18:17    


 


Albumin/Globulin Ratio  1.1  (1-3)   07/11/18  18:17    


 


Urine Color  Rosalind   07/11/18  18:45    


 


Urine Appearance  Cloudy   07/11/18  18:45    


 


Urine pH  5.0  (5-9)   07/11/18  18:45    


 


Ur Specific Gravity  1.028  (1.010-1.030)   07/11/18  18:45    


 


Urine Protein  Negative  (Negative)   07/11/18  18:45    


 


Urine Ketones  Negative  (Negative)   07/11/18  18:45    


 


Urine Blood  Negative  (Negative)   07/11/18  18:45    


 


Urine Nitrate  Negative  (Negative)   07/11/18  18:45    


 


Urine Bilirubin  Negative  (Negative)   07/11/18  18:45    


 


Urine Urobilinogen  Negative  (Negative)   07/11/18  18:45    


 


Ur Leukocyte Esterase  Negative  (Negative)   07/11/18  18:45    


 


Urine Glucose  2+(150 mg/dl)  (Negative)  A  07/11/18  18:45    


 


Urine Ascorbic Acid  *  (Negative)  A  07/11/18  18:45    











General: Laying in bed, ill appearing, Nonverbal


RLE: Patient laying frog legged. DP 2+, capillary refill less than two seconds 

distally. 


BL calves supple and nontender without erythema, edema or palpable cords.








Assessment:


[]Right femoral neck fracture 








Plan:


[]NWB RLE


Patient requires hemiarthroplasty. Daughter Tracey ( [phone]) is agreeable 

to surgical intervention and understands the need for medical optimization 

before considering surgery.


At this time it is known that prior to determining optimization patient will 

have a CT and CTA head, blood cultures and review of echo findings.


Orthopedics will continue to follow, and when medically optimized plan for 

right hemiarthroplasty.

## 2018-07-12 NOTE — PN
PROGRESS NOTE:

 

DATE OF VISIT:  07/12/18

 

HISTORY OF PRESENT ILLNESS:  Cici is here on the orthopedic service, but is 
having an aggressive workup by Medicine and possibly Oncology.  Her unwitnessed 
fall resulted in a right subcapital femur fracture.  The CT scan of the pelvis 
is questionable for a mass in the area of the iliopsoas or at least the 
obturator canal and some patchy lucencies through the ileum bilaterally.  She 
has recently had a brain MRI to investigate this rapid deterioration in mental 
status, which may be related to aggressive metastatic disease, although we do 
not know a primary source.  Workup is underway for x-ray imaging of the chest 
and abdomen.

 

PHYSICAL EXAMINATION:  Lady is lying in bed with her hip abducted flexed and 
there is significant pain verbalized when I moved her lower extremity even an 
inch or so. She does appear to be able to move her toes up and down and the 
skin appears to be intact per nursing notes.

 

IMPRESSION AND PLAN:  She certainly could have a hemiarthroplasty at this 
hospital. We would send her femoral head for pathology and some scrapings of 
the canal, but I think Anesthesia would have to be consulted to see if they 
would be amenable to intervention.  She will have to prevent significant 
nursing issues if some kind of hip stabilization is not performed as she does 
not tolerate really any log rolling or motion.  We will await the further 
evaluation by the medical team as far as underlying medical diagnoses.

 

 [id number]/CPS #: [id number]

VLAD

## 2018-07-12 NOTE — PN
Subjective


Date of Service: 18


Interval History: 





Patient seen and examined. Daughter at bedside. Gladis Bernardo ortho PA also 

present. Per patient's daughter, her mentation is not this poor. She describes 

her as forgetful but appropriate and usually conversant. States she's had 

progressive weakness over the past few months, history of smoking and last CVA 

in  with some mild right sided residual deficit. Unable to obtain ROS 2/2 

confused state. But does yell and grimace with pain and repositioning. 





Objective


Active Medications: 








Acetaminophen (Tylenol Tab*)  650 mg PO Q4H PRN


   PRN Reason: FEVER/PAIN


Al Hydrox/Mg Hydrox/Simethicone (Maalox Plus*)  30 ml PO Q6H PRN


   PRN Reason: INDIGESTION


Amlodipine Besylate (Norvasc Tab*)  10 mg PO DAILY [facility]


   Last Admin: 18 08:26 Dose:  Not Given


Dextrose (D50w Syringe 50 Ml*)  12.5 gm IV PUSH .FOR FS < 60 - SS PRN


   PRN Reason: FS < 60


Docusate Sodium (Colace Cap*)  100 mg PO BID [facility]


   Last Admin: 18 07:12 Dose:  Not Given


Hydromorphone HCl (Dilaudid Inj*)  1 mg IV Q3H PRN


   PRN Reason: PAIN


   Last Admin: 18 10:59 Dose:  1 mg


Sodium Chloride (Ns 0.9% 1000 Ml*)  1,000 mls @ 75 mls/hr IV PER RATE [facility]


   Last Admin: 18 10:12 Dose:  75 mls/hr


Insulin Human Lispro (Humalog*)  0 units SUBCUT ACHS [facility]; Protocol


   Last Admin: 18 07:27 Dose:  Not Given


Iodixanol (Visipaque* 320 (Contrast))  80 ml IV ONCE ONE


   Stop: 18 11:40


Ketorolac Tromethamine (Toradol Inj*)  15 mg IV PUSH Q6H PRN


   PRN Reason: PAIN


   Last Admin: 18 17:49 Dose:  15 mg


Magnesium Hydroxide (Milk Of Magnesia Liq*)  30 ml PO Q4H PRN


   PRN Reason: CONSTIPATION


Ondansetron HCl (Zofran Inj*)  4 mg IV Q4H PRN


   PRN Reason: NAUSEA/VOMITING


Oxycodone/Acetaminophen (Percocet 5/325 Tab*)  1 tab PO Q4H PRN


   PRN Reason: Pain








 Vital Signs - 8 hr











  18





  03:47 07:10 07:18


 


Temperature 98.4 F 98.5 F 


 


Pulse Rate 72 68 


 


Respiratory 16 16 16





Rate   


 


Blood Pressure 133/53 131/48 





(mmHg)   


 


O2 Sat by Pulse 98 99 





Oximetry   














  18





  10:59 11:16 11:42


 


Temperature  100.5 F 99.9 F


 


Pulse Rate  81 


 


Respiratory 18 14 





Rate   


 


Blood Pressure  152/54 





(mmHg)   


 


O2 Sat by Pulse  98 





Oximetry   











Oxygen Devices in Use Now: Nasal Cannula


Appearance: Alert, confused


Eyes: No Scleral Icterus, PERRLA


Ears/Nose/Mouth/Throat: - - dry oral mucosa


Neck: Trachea Midline


Respiratory: Symmetrical Chest Expansion and Respiratory Effort, - - diminished 

bases


Cardiovascular: NL Sounds; No Murmurs; No JVD, RRR, No Edema


Abdominal: NL Sounds; No Tenderness; No Distention


Extremities: No Edema


Skin: - - red, vascular mass on right shoulder with area of brown scabbing at 

center


Neurological: - - confused, sometimes non-verbal


Nutrition: Taking PO's


Result Diagrams: 


 18 10:50





 18 10:50


Microbiology and Other Data: 


 Microbiology











 18 15:40 Nasal Screen MRSA (PCR) - Final





 Nasal    Mrsa Not Detected











Diagnostic Imaging: 





Patient Name:         ROXANA KNIGHT                                        

                        Medical Record#: [id number]


Ordering Physician: Juana Pfeiffer MD                                            

                        Acct.#: [id number]


:     1940         Age: 78   Sex: F                                   

                        Location: SURGICAL STAY UNIT


Exam Date: 18                                                       

                        ADM Status: ADM IN


Order Information:                         CT PELVIS W/O


Accession Number:                          [id number]


CPT:                                       [id number]


INDICATION: Traumatic fracture right hip.





COMPARISON:  Comparison is made with a prior x-ray study of the right femur and 

pelvis


from 2018.





TECHNIQUE: Contiguous axial sections were obtained through the pelvis without 

intravenous


or oral contrast. Images were reconstructed in the coronal and sagittal planes.





FINDINGS:  The bones appear osteoporotic. There is a subcapital right femoral 

neck


fracture. The fracture fragments are overriding. There is anterior displacement 

of the


distal fragment relative the proximal fragment and rotation of the femur. There 

is varus


angulation. There is nonspecific sclerotic change in the ischial tuberosity.  

There is


mild to moderate bilateral osteoarthritic change in the hips.





There is marked enlargement of the left psoas and iliacus muscles consistent 

with a mass,


bulky adenopathy or hematoma.





The visualized portion of the small bowel and colon appear nondistended. There 

is a


catheter within the urinary bladder.





No free intraperitoneal air or fluid is seen.





IMPRESSION:  


1. DISPLACED ANGULATED RIGHT SUBCAPITAL FEMORAL NECK FRACTURE.


2. SCLEROTIC LESION WITHIN THE RIGHT ISCHIAL TUBEROSITY.


3. ENLARGEMENT OF THE LEFT PSOAS AND ILIACUS MUSCLES MOST CONSISTENT WITH A MASS

, BULKY


ADENOPATHY OR HEMATOMA. RECOMMEND A CT OF THE CHEST, ABDOMEN AND PELVIS WITH 

CONTRAST FOR


FURTHER EVALUATION.





____________________________________________________________


<Electronically signed by Corey Apodaca MD in OV>  18


Dictated By: Corey Apodaca MD


Dictated Date/Time: 18


Transcribed Date/Time: 18


Copy to:





CTA HEAD and NECK Conclusion:


[facility] IMAGING


Patient Name:ROXANA KNIGHT                                          MR:

[id number]                                  : 1940














LYMPH NODES: There is biapical emphysematous change.





BONES AND SOFT TISSUES: No bone or soft tissue abnormalities are noted.








CTA HEAD:





INTRACRANIAL CIRCULATION: There is no aneurysm, vascular malformation, occlusion

, or


stenosis of the visualized intracranial circulation. The anterior communicating 

artery


complex is clear. Bilateral posterior communicating arteries are identified.





VENOUS CIRCULATION: The venous system is unremarkable.





PERFUSION: There is no obvious parenchymal perfusion deficit.





HEMORRHAGE/INFARCT: There is no hemorrhage or acute infarct.


MASSES/SHIFT: There is no mass or shift.


EXTRA-AXIAL SPACES: There are no extra-axial fluid collections.


SULCI AND VENTRICLES: The sulci and ventricles are normal in size and position 

for the


patient's stated age.





CEREBRUM: There is hypoattenuation of the periventricular and subcortical white 

matter. 


BRAINSTEM: There are no focal parenchymal abnormalities.


CEREBELLUM: There are no focal parenchymal abnormalities.





PARANASAL SINUSES: The paranasal sinuses are clear.


ORBITS: The orbits are unremarkable.


BONES AND SOFT TISSUE: Degenerative changes are noted of the spine. There are 

multiple


lobulated masses of the right shoulder musculature, and evaluated on the current


examination.





OTHER: There is no abnormal enhancement.








IMPRESSION: 


1.  NO INTERNAL CAROTID ARTERY STENOSIS BY NASCET CRITERIA.


2.  NO ANEURYSM, VASCULAR MALFORMATION, OCCLUSION, OR STENOSIS OF THE VISUALIZED


INTRACRANIAL CIRCULATION..


3.  ATHEROSCLEROSIS.


4.  PROXIMAL VESSEL ISCHEMIC CHANGE.


5.  NO ACUTE INTRACRANIAL PATHOLOGY.


6.  MULTIPLE LOBULATED MASSES OF THE MUSCULATURE OF THE RIGHT SHOULDER GIRDLE, 

EVALUATED


ON THE CURRENT EXAMINATION SUSPICIOUS FOR SARCOMA VERSUS METASTATIC DISEASE.





MRI BRAIN


Patient Name:         ROXANA KNIGHT                                        

                        Medical Record#: [id number]


Ordering Physician: Demetra Abdullahi NP                                   

                        Acct.#: [id number]


:     1940         Age: 78   Sex: F                                   

                        Location: SURGICAL STAY UNIT


Exam Date: 18 1507                                                       

                        ADM Status: ADM IN


Order Information:                         MRI BRAIN W/O


Accession Number:                          [id number]


CPT:                                       [id number]


Indication: Worsening confusion and weakness. Previous CVAs with residual RIGHT-

sided


weakness.





Comparison: CT and CTA head neck exams of the same date.





Technique: Viadeo Optima 1.5 Hallie MW508X with GEM suite.  MRI brain without 

contrast. 





Report: Diffusion series is negative for acute or subacute ischemia. 

Susceptibility series


is negative for stigmata of hemosiderin deposition to indicate previous 

hemorrhage.





Mild prominence of the cerebral sulci and cerebellar fissures collecting 

atrophy.


Proportional ventricular enlargement. Patent basal cisterns. Disproportionate 

mild volume


loss and T2 hyperintense ischemic gliosis at the LEFT middle cerebral artery 

distribution


of the frontal and parietal lobes consistent with sequela of previous infarct. 

Additional


few nonspecific T2 hyperintense foci within the cerebral white matter without 

mass effect.


No extra-axial fluid collection evident. Preserved major intracranial flow-

voids.


Unremarkable orbital contents.





No suspicious calvarial or skull base lesions evident. Mucous retention cyst or 

polyp at


the inferior LEFT maxillary sinus. Unremarkable scalp. 





IMPRESSION: 


#. Negative for stigmata of acute or subacute ischemia. 


#. Relative mild encephalomalacia related to old LEFT MCA distribution infarct 

involving


the posterior LEFT frontal lobe and parietal lobe. 


#. Mild diffuse atrophy and stigmata of chronic small vessel ischemic disease.





____________________________________________________________


<Electronically signed by Cody Thrasher MD in OV>  18 1632


Dictated By: Cody Thrasher MD


Dictated Date/Time: 18 1632


Transcribed Date/Time: 18 1623


Copy to:





CARDIAC ECHO





Conclusions


There is normal left ventricular systolic function.


The estimated ejection fraction is 50-55%. 


Global left ventricular wall motion and contractility are within normal


limits.


Mild concentric left ventricular hypertrophy is observed.


There is an E to A reversal in the mitral valve flow pattern suggestive


of diastolic dysfunction.


There is mild to moderate aortic regurgitation. 


There is mild to moderate mitral regurgitation. 


There is mild to moderate tricuspid regurgitation.


There is evidence of mild to moderate pulmonary hypertension.


There is no prior echocardiogram available to compare with at this time.








Assess/Plan/Problems-Billing


Assessment: 





This is a 78 year old female patient transferred from a nursing home for femur 

fracture 2/2 unwitnessed fall. PMHx sig for COPD, previous CVAs, DM and HTN 

that presents with right a displaced subcapital femoral neck fracture and psoas 

hematoma.





- Patient Problems


(1) Femoral neck fracture


Code(s): S72.009A - FRACTURE OF UNSP PART OF NECK OF UNSP FEMUR, INIT   SNOMED 

Code(s): 0990834


   Comment: 


 - Unwitnessed fall, right side fracture


 - CT pelvis is showing psoas mass on the left/contralateral side to the 

fracture and a sclerotic lesion of the ischial tuberosity


 - Concern for pathologic fracture


 - Patient is NOT medically optimized for surgery. If these lesions are 

metastatic disease, conversation must be had with the daughter about options    





(2) History of CVA (cerebrovascular accident)


Code(s): Z86.73 - PRSNL HX OF TIA (TIA), AND CEREB INFRC W/O RESID DEFICITS   

SNOMED Code(s): 307383957


   Comment: 


 - Per patient's daughter she is sometimes forgetful, however, she can usually 

converse and is appropriate


 - Current presentation highly concerning for neuro deficit, she is very 

confused


 - Scans as above, no CVA or masses noted


 - May be TME, however etiology is unclear at this time


 - Continue supportive care   





(3) Hypertension


Code(s): I10 - ESSENTIAL (PRIMARY) HYPERTENSION   SNOMED Code(s): 78677616


   Comment: 


 - BP stable   





(4) COPD (chronic obstructive pulmonary disease)


Code(s): J44.9 - CHRONIC OBSTRUCTIVE PULMONARY DISEASE, UNSPECIFIED   SNOMED 

Code(s): 51044074


   Comment: 


 - CXR with COPD changes


 - allergy to albuterol per record


 - Continue supportive O2   





(5) Neoplasm


Code(s): D49.9 - NEOPLASM OF UNSPECIFIED BEHAVIOR OF UNSPECIFIED SITE   SNOMED 

Code(s): 57391149


   Comment: 


 - CTA of the neck visualized local mass in the muscularture of the right 

shouder


 - Given the mass in the psoas, this is highly suspicious for metastatic disease

/sarcoma? 


 - Will send for CT chest, abdomen and pelvis with contrast, with hx of smoking 

may have primary site in the chest


 - Reommend oncology evaluate in the morning when scans complete   





(6) Full code status


Code(s): Z78.9 - OTHER SPECIFIED HEALTH STATUS   SNOMED Code(s): 520110097


   


Status and Disposition: 





Remain inpatient. Patient will likely not be able to have surgery here for 

repair if she has a pathologic fracture 2/2 metastatic disease which is a new 

diagnosis. Will call daughter to discuss.

## 2018-07-12 NOTE — RAD
INDICATION: Traumatic fracture right hip.



COMPARISON:  Comparison is made with a prior x-ray study of the right femur and pelvis

from July 11, 2018.



TECHNIQUE: Contiguous axial sections were obtained through the pelvis without intravenous

or oral contrast. Images were reconstructed in the coronal and sagittal planes.



FINDINGS:  The bones appear osteoporotic. There is a subcapital right femoral neck

fracture. The fracture fragments are overriding. There is anterior displacement of the

distal fragment relative the proximal fragment and rotation of the femur. There is varus

angulation. There is nonspecific sclerotic change in the ischial tuberosity.  There is

mild to moderate bilateral osteoarthritic change in the hips.



There is marked enlargement of the left psoas and iliacus muscles consistent with a mass,

bulky adenopathy or hematoma.



The visualized portion of the small bowel and colon appear nondistended. There is a

catheter within the urinary bladder.



No free intraperitoneal air or fluid is seen.



IMPRESSION:  

1. DISPLACED ANGULATED RIGHT SUBCAPITAL FEMORAL NECK FRACTURE.

2. SCLEROTIC LESION WITHIN THE RIGHT ISCHIAL TUBEROSITY.

3. ENLARGEMENT OF THE LEFT PSOAS AND ILIACUS MUSCLES MOST CONSISTENT WITH A MASS, BULKY

ADENOPATHY OR HEMATOMA. RECOMMEND A CT OF THE CHEST, ABDOMEN AND PELVIS WITH CONTRAST FOR

FURTHER EVALUATION.

## 2018-07-13 PROCEDURE — 07DH3ZX EXTRACTION OF RIGHT INGUINAL LYMPHATIC, PERCUTANEOUS APPROACH, DIAGNOSTIC: ICD-10-PCS | Performed by: SURGERY

## 2018-07-13 PROCEDURE — 07D63ZX EXTRACTION OF LEFT AXILLARY LYMPHATIC, PERCUTANEOUS APPROACH, DIAGNOSTIC: ICD-10-PCS | Performed by: SURGERY

## 2018-07-13 RX ADMIN — INSULIN LISPRO SCH: 100 INJECTION, SOLUTION INTRAVENOUS; SUBCUTANEOUS at 05:36

## 2018-07-13 RX ADMIN — HYDROMORPHONE HYDROCHLORIDE PRN MG: 1 INJECTION, SOLUTION INTRAMUSCULAR; INTRAVENOUS; SUBCUTANEOUS at 05:02

## 2018-07-13 RX ADMIN — CYCLOBENZAPRINE HYDROCHLORIDE PRN MG: 10 TABLET, FILM COATED ORAL at 14:27

## 2018-07-13 RX ADMIN — SODIUM CHLORIDE SCH MLS/HR: 900 IRRIGANT IRRIGATION at 02:50

## 2018-07-13 RX ADMIN — SODIUM CHLORIDE SCH MLS/HR: 900 IRRIGANT IRRIGATION at 15:35

## 2018-07-13 RX ADMIN — AMLODIPINE BESYLATE SCH: 5 TABLET ORAL at 08:54

## 2018-07-13 RX ADMIN — CYCLOBENZAPRINE HYDROCHLORIDE PRN MG: 10 TABLET, FILM COATED ORAL at 20:39

## 2018-07-13 RX ADMIN — DOCUSATE SODIUM SCH: 100 CAPSULE, LIQUID FILLED ORAL at 08:54

## 2018-07-13 RX ADMIN — HYDROMORPHONE HYDROCHLORIDE PRN MG: 1 INJECTION, SOLUTION INTRAMUSCULAR; INTRAVENOUS; SUBCUTANEOUS at 14:28

## 2018-07-13 RX ADMIN — HYDROMORPHONE HYDROCHLORIDE PRN MG: 1 INJECTION, SOLUTION INTRAMUSCULAR; INTRAVENOUS; SUBCUTANEOUS at 00:29

## 2018-07-13 RX ADMIN — ACETAMINOPHEN PRN MG: 325 TABLET ORAL at 20:46

## 2018-07-13 RX ADMIN — INSULIN LISPRO SCH: 100 INJECTION, SOLUTION INTRAVENOUS; SUBCUTANEOUS at 11:43

## 2018-07-13 RX ADMIN — DOCUSATE SODIUM SCH MG: 100 CAPSULE, LIQUID FILLED ORAL at 20:40

## 2018-07-13 RX ADMIN — INSULIN LISPRO SCH: 100 INJECTION, SOLUTION INTRAVENOUS; SUBCUTANEOUS at 18:04

## 2018-07-13 RX ADMIN — HYDROMORPHONE HYDROCHLORIDE PRN MG: 1 INJECTION, SOLUTION INTRAMUSCULAR; INTRAVENOUS; SUBCUTANEOUS at 09:40

## 2018-07-13 RX ADMIN — HYDROMORPHONE HYDROCHLORIDE PRN MG: 1 INJECTION, SOLUTION INTRAMUSCULAR; INTRAVENOUS; SUBCUTANEOUS at 20:47

## 2018-07-13 RX ADMIN — KETOROLAC TROMETHAMINE PRN MG: 15 INJECTION, SOLUTION INTRAMUSCULAR; INTRAVENOUS at 11:40

## 2018-07-13 NOTE — PN
Subjective


Date of Service: 07/13/18


Interval History: 





Patient continues to be drowsy and moan in apparent pain intermittently. Per 

Nursing staff was able to respond to a couple questions intermittently. Unable 

to engage in ROS. Discussed with daughter, she is not insistent on surgery for 

her mother's hip if she ends up having widespread cancer. 


Family History: Unchanged from Admission


Social History: Unchanged from Admission


Past Medical History: Unchanged from Admission





Objective


Active Medications: 








Acetaminophen (Tylenol Tab*)  650 mg PO Q4H PRN


   PRN Reason: FEVER/PAIN


Al Hydrox/Mg Hydrox/Simethicone (Maalox Plus*)  30 ml PO Q6H PRN


   PRN Reason: INDIGESTION


Amlodipine Besylate (Norvasc Tab*)  10 mg PO DAILY [facility]


   Last Admin: 07/13/18 08:54 Dose:  Not Given


Cyclobenzaprine HCl (Flexeril Tab*)  5 mg PO TID PRN


   PRN Reason: SPASMS


Dextrose (D50w Syringe 50 Ml*)  12.5 gm IV PUSH .FOR FS < 60 - SS PRN


   PRN Reason: FS < 60


Diazepam (Valium Inj (Nf))  2 mg IV Q8H PRN


   PRN Reason: ANXIETY


Docusate Sodium (Colace Cap*)  100 mg PO BID [facility]


   Last Admin: 07/13/18 08:54 Dose:  Not Given


Hydromorphone HCl (Dilaudid Inj*)  1 mg IV Q3H PRN


   PRN Reason: PAIN


   Last Admin: 07/13/18 09:40 Dose:  1 mg


Sodium Chloride (Ns 0.9% 1000 Ml*)  1,000 mls @ 75 mls/hr IV PER RATE [facility]


   Last Admin: 07/13/18 02:50 Dose:  75 mls/hr


Insulin Human Lispro (Humalog*)  0 units SUBCUT 0000,0600,1200,1800 [facility]; 

Protocol


   Last Admin: 07/13/18 11:43 Dose:  Not Given


Ketorolac Tromethamine (Toradol Inj*)  15 mg IV PUSH Q6H PRN


   PRN Reason: PAIN


   Last Admin: 07/13/18 11:40 Dose:  15 mg


Magnesium Hydroxide (Milk Of Magnesia Liq*)  30 ml PO Q4H PRN


   PRN Reason: CONSTIPATION


Ondansetron HCl (Zofran Inj*)  4 mg IV Q4H PRN


   PRN Reason: NAUSEA/VOMITING


Oxycodone/Acetaminophen (Percocet 5/325 Tab*)  1 tab PO Q4H PRN


   PRN Reason: Pain








 Vital Signs - 8 hr











  07/13/18 07/13/18 07/13/18





  05:02 06:29 07:17


 


Temperature   97.4 F


 


Pulse Rate   75


 


Respiratory 16 16 16





Rate   


 


Blood Pressure   151/41





(mmHg)   


 


O2 Sat by Pulse   95





Oximetry   














  07/13/18 07/13/18 07/13/18





  07:40 09:40 11:27


 


Temperature   98.9 F


 


Pulse Rate   81


 


Respiratory 18 20 16





Rate   


 


Blood Pressure   146/51





(mmHg)   


 


O2 Sat by Pulse   92





Oximetry   














  07/13/18





  11:34


 


Temperature 


 


Pulse Rate 


 


Respiratory 16





Rate 


 


Blood Pressure 





(mmHg) 


 


O2 Sat by Pulse 





Oximetry 











Oxygen Devices in Use Now: Nasal Cannula


Appearance: Patient is a 79yo female who appears stated age and is sitting in 

the bed moaning occasionally in apparent pain.


Eyes: No Scleral Icterus, PERRLA


Ears/Nose/Mouth/Throat: NL Teeth, Lips, Gums, Clear Oropharnyx, Mucous 

Membranes Moist


Neck: NL Appearance and Movements; NL JVP, Trachea Midline


Respiratory: Symmetrical Chest Expansion and Respiratory Effort, Clear to 

Auscultation


Cardiovascular: NL Sounds; No Murmurs; No JVD, RRR, No Edema


Abdominal: NL Sounds; No Tenderness; No Distention, No Hepatosplenomegaly


Lymphatic: No Cervical Adenopathy, - - Left axillary mass. Right shoulder mass. 


Extremities: No Edema, No Clubbing, Cyanosis


Skin: No Rash or Ulcers, No Nodules or Sclerosis


Result Diagrams: 


 07/12/18 10:50





 07/12/18 10:50


Microbiology and Other Data: 


 Microbiology











 07/11/18 15:40 Nasal Screen MRSA (PCR) - Final





 Nasal    Mrsa Not Detected














Assess/Plan/Problems-Billing


Assessment: 





This is a 78 year old female patient transferred from a nursing home for femur 

fracture 2/2 unwitnessed fall. PMHx sig for COPD, previous CVAs, DM and HTN 

that presents with a right sided displaced subcapital femoral neck fracture and 

multiple masses concerning for malignancy. 





- Patient Problems


(1) Neoplasm


Current Visit: Yes   Status: Acute   Code(s): D49.9 - NEOPLASM OF UNSPECIFIED 

BEHAVIOR OF UNSPECIFIED SITE   SNOMED Code(s): 30769527


   Comment: 


CTA of the neck visualized local mass in the muscularture of the right shouder


Given the mass in the psoas, this is highly suspicious for metastatic disease/

sarcoma? 


CT chest, abdomen and pelvis with contrast shows numeroud masses. 


Appreciate oncology input. 


FNA of left axillary mass pending.    





(2) COPD (chronic obstructive pulmonary disease)


Current Visit: Yes   Status: Acute   Code(s): J44.9 - CHRONIC OBSTRUCTIVE 

PULMONARY DISEASE, UNSPECIFIED   SNOMED Code(s): 71330426


   Comment: 


CXR with COPD changes


Allergy to albuterol per record


Continue supportive O2


No signs of exacerbation.    





(3) Femoral neck fracture


Current Visit: Yes   Status: Acute   Code(s): S72.009A - FRACTURE OF UNSP PART 

OF NECK OF UNSP FEMUR, INIT   SNOMED Code(s): 9195875


   Comment: 


Unwitnessed fall, right side fracture


CT pelvis is showing psoas mass on the left/contralateral side to the fracture 

and a sclerotic lesion of the ischial tuberosity


Concern for pathologic fracture


Patient is NOT medically optimized for surgery. 


Discussed with daughter and she is not insistant on going forward with surgery


Will need to be transferred if surgery indicated. 


Does not appear to be main source of patient discomfort.    





(4) History of CVA (cerebrovascular accident)


Current Visit: Yes   Status: Acute   Code(s): Z86.73 - PRSNL HX OF TIA (TIA), 

AND CEREB INFRC W/O RESID DEFICITS   SNOMED Code(s): 823389389


   Comment: 


Per patient's daughter she is sometimes forgetful, however, she can usually 

converse and is appropriate


Unclear cause of current AMS. No Mass or CVA on MRI. 


   





(5) Hypertension


Current Visit: Yes   Status: Acute   Code(s): I10 - ESSENTIAL (PRIMARY) 

HYPERTENSION   SNOMED Code(s): 26627720


   Comment: 


BP stable   





(6) Full code status


Current Visit: Yes   Status: Acute   Code(s): Z78.9 - OTHER SPECIFIED HEALTH 

STATUS   SNOMED Code(s): 476420575


   


Status and Disposition: 





Remain inpatient.

## 2018-07-13 NOTE — PN
Progress Note





- Progress Note


Date of Service: 07/13/18


SOAP: 


Subjective:


Pt is lying comfortably in bed. Nonverbal to questions asked








Objective:


PE- 77 y/o WDWN F lying comfortably in bed


RLE- skin intact, no warmth or erythema, frog leg position, calf soft NT, brisk 

cap refill, + 2 PT pulse, SILT distally





 Vital Signs











Temp Pulse Resp BP Pulse Ox


 


 97.4 F   75   20   151/41   95 


 


 07/13/18 07:17  07/13/18 07:17  07/13/18 09:40  07/13/18 07:17  07/13/18 07:17








 Laboratory Results - last 24 hr











  07/12/18 07/12/18 07/12/18





  10:50 10:50 10:50


 


WBC  9.9  


 


RBC  4.28  


 


Hgb  11.8 L  


 


Hct  36  


 


MCV  84  


 


MCH  28  


 


MCHC  33  


 


RDW  16 H  


 


Plt Count  319  


 


MPV  7.4  


 


Neut % (Auto)  86.9 H  


 


Lymph % (Auto)  5.4 L  


 


Mono % (Auto)  6.8  


 


Eos % (Auto)  0.4  


 


Baso % (Auto)  0.5  


 


Absolute Neuts (auto)  8.6 H  


 


Absolute Lymphs (auto)  0.5 L  


 


Absolute Monos (auto)  0.7  


 


Absolute Eos (auto)  0  


 


Absolute Basos (auto)  0  


 


Absolute Nucleated RBC  0  


 


Nucleated RBC %  0.1  


 


INR (Anticoag Therapy)   0.94 


 


Sodium    136


 


Potassium    4.1


 


Chloride    99 L


 


Carbon Dioxide    30


 


Anion Gap    7


 


BUN    11


 


Creatinine    0.35 L


 


Est GFR ( Amer)    217.9


 


Est GFR (Non-Af Amer)    180.1


 


BUN/Creatinine Ratio    31.4 H


 


Glucose    101 H


 


POC Glucose (mg/dL)   


 


Calcium    8.5 L














  07/12/18 07/12/18 07/12/18





  12:35 17:47 23:57


 


WBC   


 


RBC   


 


Hgb   


 


Hct   


 


MCV   


 


MCH   


 


MCHC   


 


RDW   


 


Plt Count   


 


MPV   


 


Neut % (Auto)   


 


Lymph % (Auto)   


 


Mono % (Auto)   


 


Eos % (Auto)   


 


Baso % (Auto)   


 


Absolute Neuts (auto)   


 


Absolute Lymphs (auto)   


 


Absolute Monos (auto)   


 


Absolute Eos (auto)   


 


Absolute Basos (auto)   


 


Absolute Nucleated RBC   


 


Nucleated RBC %   


 


INR (Anticoag Therapy)   


 


Sodium   


 


Potassium   


 


Chloride   


 


Carbon Dioxide   


 


Anion Gap   


 


BUN   


 


Creatinine   


 


Est GFR ( Amer)   


 


Est GFR (Non-Af Amer)   


 


BUN/Creatinine Ratio   


 


Glucose   


 


POC Glucose (mg/dL)  97  107 H  98


 


Calcium   














  07/13/18





  05:35


 


WBC 


 


RBC 


 


Hgb 


 


Hct 


 


MCV 


 


MCH 


 


MCHC 


 


RDW 


 


Plt Count 


 


MPV 


 


Neut % (Auto) 


 


Lymph % (Auto) 


 


Mono % (Auto) 


 


Eos % (Auto) 


 


Baso % (Auto) 


 


Absolute Neuts (auto) 


 


Absolute Lymphs (auto) 


 


Absolute Monos (auto) 


 


Absolute Eos (auto) 


 


Absolute Basos (auto) 


 


Absolute Nucleated RBC 


 


Nucleated RBC % 


 


INR (Anticoag Therapy) 


 


Sodium 


 


Potassium 


 


Chloride 


 


Carbon Dioxide 


 


Anion Gap 


 


BUN 


 


Creatinine 


 


Est GFR ( Amer) 


 


Est GFR (Non-Af Amer) 


 


BUN/Creatinine Ratio 


 


Glucose 


 


POC Glucose (mg/dL)  93


 


Calcium 

















Assessment:


Right femoral neck fx








Plan:


NWB RLE


Discussed with hospitalist and Dr. Powres, high suspicion for cancer with 

metastasis and a pathological fx, Per Dr. Powers will likely get fine needle 

aspiration of axillary mass to further investigate.


Pt will likely need palliative care/hospice.  If the family would like surgery 

the patient would likely have to be transferred for a hemiarthroplasty in the 

presence of a pathological fracture


Cont pain control


Orthopedics will cont to follow

## 2018-07-14 LAB
BASOPHILS # BLD AUTO: 0 10^3/UL (ref 0–0.2)
EOSINOPHIL # BLD AUTO: 0 10^3/UL (ref 0–0.6)
HCT VFR BLD AUTO: 35 % (ref 35–47)
HGB BLD-MCNC: 11.2 G/DL (ref 12–16)
LYMPHOCYTES # BLD AUTO: 0.3 10^3/UL (ref 1–4.8)
MCH RBC QN AUTO: 28 PG (ref 27–31)
MCHC RBC AUTO-ENTMCNC: 33 G/DL (ref 31–36)
MCV RBC AUTO: 85 FL (ref 80–97)
MONOCYTES # BLD AUTO: 0.5 10^3/UL (ref 0–0.8)
NEUTROPHILS # BLD AUTO: 10.2 10^3/UL (ref 1.5–7.7)
NRBC # BLD AUTO: 0 10^3/UL
NRBC BLD QL AUTO: 0
PLATELET # BLD AUTO: 288 10^3/UL (ref 150–450)
RBC # BLD AUTO: 4.07 10^6/UL (ref 4–5.4)
WBC # BLD AUTO: 11.2 10^3/UL (ref 3.5–10.8)

## 2018-07-14 RX ADMIN — OXYCODONE HYDROCHLORIDE AND ACETAMINOPHEN PRN TAB: 5; 325 TABLET ORAL at 04:22

## 2018-07-14 RX ADMIN — HYDROMORPHONE HYDROCHLORIDE PRN MG: 1 INJECTION, SOLUTION INTRAMUSCULAR; INTRAVENOUS; SUBCUTANEOUS at 03:22

## 2018-07-14 RX ADMIN — OXYCODONE HYDROCHLORIDE AND ACETAMINOPHEN PRN TAB: 5; 325 TABLET ORAL at 13:22

## 2018-07-14 RX ADMIN — DOCUSATE SODIUM SCH: 100 CAPSULE, LIQUID FILLED ORAL at 09:46

## 2018-07-14 RX ADMIN — INSULIN LISPRO SCH: 100 INJECTION, SOLUTION INTRAVENOUS; SUBCUTANEOUS at 12:36

## 2018-07-14 RX ADMIN — CYCLOBENZAPRINE HYDROCHLORIDE PRN MG: 10 TABLET, FILM COATED ORAL at 09:35

## 2018-07-14 RX ADMIN — INSULIN LISPRO SCH: 100 INJECTION, SOLUTION INTRAVENOUS; SUBCUTANEOUS at 00:32

## 2018-07-14 RX ADMIN — INSULIN LISPRO SCH: 100 INJECTION, SOLUTION INTRAVENOUS; SUBCUTANEOUS at 05:55

## 2018-07-14 RX ADMIN — DOCUSATE SODIUM SCH MG: 100 CAPSULE, LIQUID FILLED ORAL at 20:29

## 2018-07-14 RX ADMIN — AMLODIPINE BESYLATE SCH MG: 5 TABLET ORAL at 09:46

## 2018-07-14 RX ADMIN — OXYCODONE HYDROCHLORIDE AND ACETAMINOPHEN PRN TAB: 5; 325 TABLET ORAL at 20:14

## 2018-07-14 RX ADMIN — SODIUM CHLORIDE SCH MLS/HR: 900 IRRIGANT IRRIGATION at 20:13

## 2018-07-14 RX ADMIN — INSULIN LISPRO SCH: 100 INJECTION, SOLUTION INTRAVENOUS; SUBCUTANEOUS at 18:18

## 2018-07-14 RX ADMIN — INSULIN LISPRO SCH UNIT: 100 INJECTION, SOLUTION INTRAVENOUS; SUBCUTANEOUS at 23:56

## 2018-07-14 NOTE — CONS
CC:  Héctor Dumas MD at [facility]; Dr. Pfeiffer of Orthopedics *

 

MEDICAL ONCOLOGY CONSULTATION NOTE:

 

DATE OF CONSULT:  07/13/18

 

REASON FOR CONSULTATION:  Imaging with evidence for widespread metastatic 
malignancy.

 

HISTORY OF PRESENT ILLNESS:  Cici Lay is a 78-year-old female whose 
history is obtained via review of the records at [facility] admission H and P from [place] dated 05/24/18, and review of the 
chart.

 

She is a 78-year-old female who per Dr. Dumas's note of 05/24/18 has been 
very noncompliant with suggestions for improving her medical care.  She had a 
longstanding history of hypertension, diabetes, COPD, and TIA in the past.  Per 
conversation with her daughter, she had been living independently until May 
2018. At that point, she had difficulty with mobility when being helped to 
restore through the office for the aging.  She also had become much more 
confused.  At that time, per her daughter she still was verbal, but was much 
more confused.  There was a question by Dr. Dumas whether she might have 
suffered a TIA or RIND and following a brief hospitalization at [facility] was admitted to the [facility].  CT scan of 
the brain had been unremarkable at that time.  She remained at the nursing home 
from 05/24/18 until 07/11/18. Unfortunately, she fell on a couple of occasions 
while at [facility].  Films from 07/10/18 revealed a displaced right femoral 
fracture and the patient was clearly in severe pain subsequent to this.  She 
was transferred to [facility] for anticipation of orthopedic 
intervention for a fractured hip.

 

Since admission to the hospital, imaging has been obtained, which has revealed 
on CT scan of the chest, abdomen, and pelvis, large left axillary adenopathy 
measuring 3.2 x 5.2 cm.  No other adenopathy of the chest.  No significant 
abnormalities are noted in the lungs.  There is a large mass in the right 
trapezius muscle at least partially tumor, although some of this may be 
hematoma with additional tumor involved in the right infraspinatus and teres 
minor muscles.  There are sclerotic lesions in the T5 and T10 vertebral bodies 
as well as the left 3rd rib laterally. The abdomen revealed significant 
retroperitoneal adenopathy measuring up to 6.4 cm along with a mass in the 
right psoas muscle, right inguinal adenopathy and a right adrenal mass 
measuring 3.1 x 2.1 cm.  In addition, there is a right femoral neck fracture 
with an osteoblastic lesion in the right ischial tuberosity.

 

PAST MEDICAL HISTORY:

1.  Hypertension.

2.  Diabetes mellitus.

3.  COPD.

4.  Dementia status post distant TIA and question of more recent TIA.

5.  Status post tubal ligation.

6.  Status post cholecystectomy.

 

MEDICATIONS:  At the time of admission include:

1.  Norvasc 10 mg daily.

2.  Insulin including both regular and Humulin.

3.  Toradol 15 mg p.r.n.

4.  Melatonin 1 mg at h.s.

5.  Metformin 750 mg daily.

6.  Norco 5/325 q.4 hours p.r.n. pain as well as other p.r.n. medications and 
bowel meds.

 

SOCIAL HISTORY:  The patient has 2 daughters who live in [place] who have been 
in followup with her care.  Tracey, [phone] and Asya, [phone].  The patient 
is a smoker for many decades and per Dr. Dumas's note was still smoking up 
until the time of admission.  Alcohol, occasional.

 

REVIEW OF SYSTEMS:  Unobtainable due to severe dementia and minimal interaction 
at the time of my consultation.

 

PHYSICAL EXAMINATION:  A 78-year-old female, lying in bed with right arm bent 
90 degrees at the elbow and complaining of severe pain on any movement of the 
right arm or shoulder.  Right leg is rotated.  The patient answered several 
questions, yes or no, seemingly appropriately, but will not give me her name or 
in any other ways interact or speak other than to wave when I entered the room.
  Vital Signs: Blood pressure 151/41, pulse 75.  Afebrile.  HEENT:  PERRL, 
EOMI.  No erythema or exudates.  No palpable cervical, supraclavicular 
adenopathy.  Lungs:  Clear. Heart:  Regular rate and rhythm without murmurs, 
rubs, or gallops.  Breasts:  No masses or discharge bilaterally.  Abdomen:  Soft
, nontender without masses or organomegaly.  Lymph node exam:  Large left 
axillary adenopathy.  Small right inguinal adenopathy.  No other adenopathy is 
palpable.  Extremities:  No clubbing, cyanosis, or edema.  Right shoulder and 
right leg as discussed above.  Neurologic Exam:  The patient is unable to give 
me information as to orientation and is quite lethargic.

 

LABORATORY DATA:  Laboratory studies have shown essentially normal CBC with 
only mild anemia with H and H of 36/11.8.  Chemistry studies with normal renal 
function, elevated alk phos at 229.  Other LFTs are essentially normal.

 

DIAGNOSTIC STUDIES:  Imaging of the brain includes both the CTA and an MRI.  
MRI of the brain with no evidence for ischemia or mass.  There is relative 
encephalomalacia related to a left MCA distribution infarct.  In addition, mild 
atrophy is noted.  The CTA shows no significant carotid artery stenosis and no 
other major vascular abnormalities.

 

Since admission to the hospital, she has been seen by her daughter who I spoke 
to at the time of this consultation.  She reports that she is much more confused
, much less verbal, and has clearly changed even over the past 1 to 2 months.

 

IMPRESSION:  A 78-year-old female with likely widespread metastatic carcinoma 
or lymphoma.  She clearly has declined dramatically over the past 2 months per 
notes from Dr. Dumas and from information obtained from her daughter.  It is 
likely that given her current situation that she will be unable to have any 
therapy, and it is  likely widespread malignancy.  In that situation, pain 
control will be paramount. In fact at the time of our exam, she is complaining 
of more pain in the right shoulder region than she is at the right hip where 
the fracture has been sustained. I discussed the situation with the daughter 
who is in agreement with a fine-needle aspiration of left axillary adenopathy.  
If this shows malignancy, it is likely that we will not proceed to repair of 
the right hip fracture.  This has been discussed with Orthopedics and also with 
the primary care team.  Dr. Marte is unavailable today, so Dr. Wheeler has 
been asked to do a fine-needle aspiration in conjunction with pathology.  Once 
pathology is available, further recommendations will be made to her daughter, 
Tracey, who understands how serious  her mother's situation is.  



 [id number]/[facility] #: [id number]

[facility]

## 2018-07-14 NOTE — PN
Progress Note





- Progress Note


Date of Service: 07/14/18


SOAP: 


Subjective:


Pt is lying comfortably in bed. Nonverbal to questions asked








Objective:


PE- 77 y/o WDWN F lying comfortably in bed


RLE- skin intact, no warmth or erythema, frog leg position, calf is soft, + 2 

PT pulse. She cries out whenever she is touched regardless of where on the 

lower extremity this is, it is not believed a result of pain. 





  Vital Signs











Temp  97.7 F   07/14/18 07:31


 


Pulse  60   07/14/18 07:31


 


Resp  14   07/14/18 09:35


 


BP  123/38   07/14/18 07:31


 


Pulse Ox  97   07/14/18 07:31








 Intake & Output











 07/13/18 07/14/18 07/14/18





 18:59 06:59 18:59


 


Intake Total 1049 1190 


 


Output Total 235 500 


 


Balance 814 690 


 


Intake:   


 


  IV Fluids 989 980 


 


    NS (0.9%) 989 980 


 


  Oral 60 210 


 


Output:   


 


  Urine 35  


 


  Casey 200 500 














Assessment:


Right femoral neck fx








Plan:


NWB RLE


Yesterday it was discussed with hospitalist and Dr. Powers, high suspicion for 

cancer with metastasis and a pathological fx, Per Dr. Powers will likely get 

fine needle aspiration of axillary mass to further investigate.


Pt will likely need palliative care/hospice.  If the family would like surgery 

the patient would likely have to be transferred for a hemiarthroplasty in the 

presence of a pathological fracture


Cont pain control


Orthopedics will cont to follow

## 2018-07-14 NOTE — CONS
C:  Dr. Ramin Wheeler; Dr. Powers *

 

CONSULTATION REPORT:

 

DATE OF CONSULT:  07/13/18

 

HISTORY OF PRESENT ILLNESS:  The patient is a 78-year-old female who was 
admitted from the nursing home with possible fractured hip.  On this admission, 
she was identified to have some lymphadenopathy and Dr. Powers was consulted.  
He would like to have a fine-needle aspiration biopsy to assess the nature of 
her underlying tumor, then has contacted me regarding possible fine-needle 
aspiration biopsy.

 

I have examined the patient and reviewed her CT scan and she has about a 2 cm 
right groin node, which is readily palpable and about a 4 cm left axillary node
, which is readily palpable.  I discussed it with the patient and discussed it 
with her daughter, Tracey Russ, and the patient is not really able to 
comprehend the procedure, but after discussion with her daughter, it was 
decided that fine-needle aspiration biopsy would be carried out to diagnose her 
lymphadenopathy.

 

PROCEDURE:  Therefore, the left axilla was prepped with alcohol and a 25-gauge 
needle was used to aspirate the mass with multiple passes.  It is bloody tap.  
The needle was passed off to the pathologist for examination.  They would like 
a little bit more tissue, so the right groin is addressed and again a 25-gauge 
needle used to aspirate the mass.  Again, it is little bit of a bloody aspirate 
and again the syringe was passed off to the pathologist.  The pathologist has 
determined that there is adequate material.  A bandage was placed in each case.
  The patient tolerated this well.  There was minimal bleeding involved.  
Specimens are from the left axillary lymph node and the right groin lymph node 
and will be processed directly by Pathology.

 

 [id number]/[facility] #: [id number]

[facility] Hospital[facility]HELLE

## 2018-07-15 RX ADMIN — INSULIN LISPRO SCH UNIT: 100 INJECTION, SOLUTION INTRAVENOUS; SUBCUTANEOUS at 20:55

## 2018-07-15 RX ADMIN — CYCLOBENZAPRINE HYDROCHLORIDE PRN MG: 10 TABLET, FILM COATED ORAL at 06:04

## 2018-07-15 RX ADMIN — SODIUM CHLORIDE SCH MLS/HR: 900 IRRIGANT IRRIGATION at 22:41

## 2018-07-15 RX ADMIN — SODIUM CHLORIDE SCH MLS/HR: 900 IRRIGANT IRRIGATION at 15:05

## 2018-07-15 RX ADMIN — CYCLOBENZAPRINE HYDROCHLORIDE PRN MG: 10 TABLET, FILM COATED ORAL at 20:45

## 2018-07-15 RX ADMIN — HYDROMORPHONE HYDROCHLORIDE PRN MG: 1 INJECTION, SOLUTION INTRAMUSCULAR; INTRAVENOUS; SUBCUTANEOUS at 06:29

## 2018-07-15 RX ADMIN — OXYCODONE HYDROCHLORIDE AND ACETAMINOPHEN PRN TAB: 5; 325 TABLET ORAL at 20:46

## 2018-07-15 RX ADMIN — KETOROLAC TROMETHAMINE PRN MG: 15 INJECTION, SOLUTION INTRAMUSCULAR; INTRAVENOUS at 12:19

## 2018-07-15 RX ADMIN — INSULIN LISPRO SCH UNIT: 100 INJECTION, SOLUTION INTRAVENOUS; SUBCUTANEOUS at 12:17

## 2018-07-15 RX ADMIN — MAGNESIUM HYDROXIDE PRN ML: 400 SUSPENSION ORAL at 20:45

## 2018-07-15 RX ADMIN — DOCUSATE SODIUM SCH MG: 100 CAPSULE, LIQUID FILLED ORAL at 20:45

## 2018-07-15 RX ADMIN — AMLODIPINE BESYLATE SCH MG: 5 TABLET ORAL at 08:58

## 2018-07-15 RX ADMIN — OXYCODONE HYDROCHLORIDE AND ACETAMINOPHEN PRN TAB: 5; 325 TABLET ORAL at 12:18

## 2018-07-15 RX ADMIN — INSULIN LISPRO SCH: 100 INJECTION, SOLUTION INTRAVENOUS; SUBCUTANEOUS at 16:58

## 2018-07-15 RX ADMIN — DOCUSATE SODIUM SCH MG: 100 CAPSULE, LIQUID FILLED ORAL at 08:58

## 2018-07-15 RX ADMIN — OXYCODONE HYDROCHLORIDE AND ACETAMINOPHEN PRN TAB: 5; 325 TABLET ORAL at 02:30

## 2018-07-15 RX ADMIN — OXYCODONE HYDROCHLORIDE AND ACETAMINOPHEN PRN TAB: 5; 325 TABLET ORAL at 16:51

## 2018-07-15 RX ADMIN — SODIUM CHLORIDE SCH MLS/HR: 900 IRRIGANT IRRIGATION at 06:01

## 2018-07-15 RX ADMIN — INSULIN LISPRO SCH: 100 INJECTION, SOLUTION INTRAVENOUS; SUBCUTANEOUS at 05:24

## 2018-07-15 RX ADMIN — CYCLOBENZAPRINE HYDROCHLORIDE PRN MG: 10 TABLET, FILM COATED ORAL at 14:12

## 2018-07-15 RX ADMIN — MAGNESIUM HYDROXIDE PRN ML: 400 SUSPENSION ORAL at 08:58

## 2018-07-15 NOTE — PN
Subjective


Date of Service: 07/15/18


Interval History: 





Patient minimally responsive to questioning. Will follow around room with eyes, 

and denies pain when asked, but will not respond to other questioning. Unable 

to Complete ROS. Able to eat small amount of jello with lunch. 


Family History: Unchanged from Admission


Social History: Unchanged from Admission


Past Medical History: Unchanged from Admission





Objective


Active Medications: 








Acetaminophen (Tylenol Tab*)  650 mg PO Q4H PRN


   PRN Reason: FEVER/PAIN


   Last Admin: 18 20:46 Dose:  650 mg


Al Hydrox/Mg Hydrox/Simethicone (Maalox Plus*)  30 ml PO Q6H PRN


   PRN Reason: INDIGESTION


Amlodipine Besylate (Norvasc Tab*)  10 mg PO DAILY [facility]


   Last Admin: 07/15/18 08:58 Dose:  10 mg


Cyclobenzaprine HCl (Flexeril Tab*)  5 mg PO TID PRN


   PRN Reason: SPASMS


   Last Admin: 07/15/18 06:04 Dose:  5 mg


Dextrose (D50w Syringe 50 Ml*)  12.5 gm IV PUSH .FOR FS < 60 - SS PRN


   PRN Reason: FS < 60


Diazepam (Valium Inj (Nf))  2 mg IV Q8H PRN


   PRN Reason: Spasm


Docusate Sodium (Colace Cap*)  100 mg PO BID [facility]


   Last Admin: 07/15/18 08:58 Dose:  100 mg


Hydromorphone HCl (Dilaudid Inj*)  1 mg IV Q3H PRN


   PRN Reason: PAIN


   Last Admin: 07/15/18 06:29 Dose:  1 mg


Sodium Chloride (Ns 0.9% 1000 Ml*)  1,000 mls @ 125 mls/hr IV PER RATE [facility]


Insulin Human Lispro (Humalog*)  0 units SUBCUT 0000,0600,1200,1800 [facility]; 

Protocol


   Last Admin: 07/15/18 12:17 Dose:  1 unit


Magnesium Hydroxide (Milk Of Magnesia Liq*)  30 ml PO Q4H PRN


   PRN Reason: CONSTIPATION


   Last Admin: 07/15/18 08:58 Dose:  30 ml


Ondansetron HCl (Zofran Inj*)  4 mg IV Q4H PRN


   PRN Reason: NAUSEA/VOMITING


Oxycodone/Acetaminophen (Percocet 5/325 Tab*)  1 tab PO Q4H PRN


   PRN Reason: Pain


   Last Admin: 07/15/18 12:18 Dose:  1 tab








 Vital Signs - 8 hr











  07/15/18 07/15/18 07/15/18





  06:04 06:29 07:30


 


Temperature   


 


Pulse Rate   


 


Respiratory 18 18 15





Rate   


 


Blood Pressure   





(mmHg)   


 


O2 Sat by Pulse   





Oximetry   














  07/15/18 07/15/18 07/15/18





  07:34 08:13 08:14


 


Temperature  96.9 F 


 


Pulse Rate  70 


 


Respiratory 15 14 14





Rate   


 


Blood Pressure  128/45 





(mmHg)   


 


O2 Sat by Pulse  99 





Oximetry   














  07/15/18 07/15/18





  11:48 12:18


 


Temperature 98.4 F 


 


Pulse Rate 73 


 


Respiratory 18 15





Rate  


 


Blood Pressure 136/51 





(mmHg)  


 


O2 Sat by Pulse 98 





Oximetry  











Oxygen Devices in Use Now: Nasal Cannula


Appearance: Patient is a 77yo female who appears stated age and is sitting in 

the bed in NAD.


Eyes: No Scleral Icterus, PERRLA


Ears/Nose/Mouth/Throat: NL Teeth, Lips, Gums, Clear Oropharnyx, Mucous 

Membranes Moist


Neck: NL Appearance and Movements; NL JVP, Trachea Midline


Respiratory: Symmetrical Chest Expansion and Respiratory Effort, Clear to 

Auscultation


Cardiovascular: NL Sounds; No Murmurs; No JVD, RRR, No Edema


Abdominal: NL Sounds; No Tenderness; No Distention, No Hepatosplenomegaly


Lymphatic: No Cervical Adenopathy, - - Stable masses. 


Extremities: No Edema, No Clubbing, Cyanosis, - - Bent and Abducted leg on 

right side. 


Skin: No Rash or Ulcers, No Nodules or Sclerosis


Neurological: - - CN II-XII intact.


Result Diagrams: 


 18 05:28





 18 05:28


Microbiology and Other Data: 


 Microbiology











 18 15:40 Nasal Screen MRSA (PCR) - Final





 Nasal    Mrsa Not Detected











Diagnostic Imaging: 





Patient Name:         ROXANA KNIGHT                                        

                        Medical Record#: [id number]


Ordering Physician: Juana Pfeiffer MD                                            

                        Acct.#: [id number]


:     1940         Age: 78   Sex: F                                   

                        Location: SURGICAL STAY UNIT


Exam Date: 18                                                       

                        ADM Status: ADM IN


Order Information:                         CT PELVIS W/O


Accession Number:                          [id number]


CPT:                                       [id number]


INDICATION: Traumatic fracture right hip.





COMPARISON:  Comparison is made with a prior x-ray study of the right femur and 

pelvis


from 2018.





TECHNIQUE: Contiguous axial sections were obtained through the pelvis without 

intravenous


or oral contrast. Images were reconstructed in the coronal and sagittal planes.





FINDINGS:  The bones appear osteoporotic. There is a subcapital right femoral 

neck


fracture. The fracture fragments are overriding. There is anterior displacement 

of the


distal fragment relative the proximal fragment and rotation of the femur. There 

is varus


angulation. There is nonspecific sclerotic change in the ischial tuberosity.  

There is


mild to moderate bilateral osteoarthritic change in the hips.





There is marked enlargement of the left psoas and iliacus muscles consistent 

with a mass,


bulky adenopathy or hematoma.





The visualized portion of the small bowel and colon appear nondistended. There 

is a


catheter within the urinary bladder.





No free intraperitoneal air or fluid is seen.





IMPRESSION:  


1. DISPLACED ANGULATED RIGHT SUBCAPITAL FEMORAL NECK FRACTURE.


2. SCLEROTIC LESION WITHIN THE RIGHT ISCHIAL TUBEROSITY.


3. ENLARGEMENT OF THE LEFT PSOAS AND ILIACUS MUSCLES MOST CONSISTENT WITH A MASS

, BULKY


ADENOPATHY OR HEMATOMA. RECOMMEND A CT OF THE CHEST, ABDOMEN AND PELVIS WITH 

CONTRAST FOR


FURTHER EVALUATION.





____________________________________________________________


<Electronically signed by Corey Apodaca MD in OV>  18


Dictated By: Corey Apodaca MD


Dictated Date/Time: 18


Transcribed Date/Time: 18


Copy to:





CTA HEAD and NECK Conclusion:


[facility] IMAGING


Patient Name:ROXANA KNIGHT                                          MR:

[id number]                                  : 1940














LYMPH NODES: There is biapical emphysematous change.





BONES AND SOFT TISSUES: No bone or soft tissue abnormalities are noted.








CTA HEAD:





INTRACRANIAL CIRCULATION: There is no aneurysm, vascular malformation, occlusion

, or


stenosis of the visualized intracranial circulation. The anterior communicating 

artery


complex is clear. Bilateral posterior communicating arteries are identified.





VENOUS CIRCULATION: The venous system is unremarkable.





PERFUSION: There is no obvious parenchymal perfusion deficit.





HEMORRHAGE/INFARCT: There is no hemorrhage or acute infarct.


MASSES/SHIFT: There is no mass or shift.


EXTRA-AXIAL SPACES: There are no extra-axial fluid collections.


SULCI AND VENTRICLES: The sulci and ventricles are normal in size and position 

for the


patient's stated age.





CEREBRUM: There is hypoattenuation of the periventricular and subcortical white 

matter. 


BRAINSTEM: There are no focal parenchymal abnormalities.


CEREBELLUM: There are no focal parenchymal abnormalities.





PARANASAL SINUSES: The paranasal sinuses are clear.


ORBITS: The orbits are unremarkable.


BONES AND SOFT TISSUE: Degenerative changes are noted of the spine. There are 

multiple


lobulated masses of the right shoulder musculature, and evaluated on the current


examination.





OTHER: There is no abnormal enhancement.








IMPRESSION: 


1.  NO INTERNAL CAROTID ARTERY STENOSIS BY NASCET CRITERIA.


2.  NO ANEURYSM, VASCULAR MALFORMATION, OCCLUSION, OR STENOSIS OF THE VISUALIZED


INTRACRANIAL CIRCULATION..


3.  ATHEROSCLEROSIS.


4.  PROXIMAL VESSEL ISCHEMIC CHANGE.


5.  NO ACUTE INTRACRANIAL PATHOLOGY.


6.  MULTIPLE LOBULATED MASSES OF THE MUSCULATURE OF THE RIGHT SHOULDER GIRDLE, 

EVALUATED


ON THE CURRENT EXAMINATION SUSPICIOUS FOR SARCOMA VERSUS METASTATIC DISEASE.





MRI BRAIN


Patient Name:         ROXANA KNIGHT                                        

                        Medical Record#: [id number]


Ordering Physician: Demetra Abdullahi NP                                   

                        Acct.#: [id number]


:     1940         Age: 78   Sex: F                                   

                        Location: SURGICAL STAY UNIT


Exam Date: 18 1507                                                       

                        ADM Status: ADM IN


Order Information:                         MRI BRAIN W/O


Accession Number:                          [id number]


CPT:                                       [id number]


Indication: Worsening confusion and weakness. Previous CVAs with residual RIGHT-

sided


weakness.





Comparison: CT and CTA head neck exams of the same date.





Technique: SCHAD Optima 1.5 Hallie [id number] with [place] suite.  MRI brain without 

contrast. 





Report: Diffusion series is negative for acute or subacute ischemia. 

Susceptibility series


is negative for stigmata of hemosiderin deposition to indicate previous 

hemorrhage.





Mild prominence of the cerebral sulci and cerebellar fissures collecting 

atrophy.


Proportional ventricular enlargement. Patent basal cisterns. Disproportionate 

mild volume


loss and T2 hyperintense ischemic gliosis at the LEFT middle cerebral artery 

distribution


of the frontal and parietal lobes consistent with sequela of previous infarct. 

Additional


few nonspecific T2 hyperintense foci within the cerebral white matter without 

mass effect.


No extra-axial fluid collection evident. Preserved major intracranial flow-

voids.


Unremarkable orbital contents.





No suspicious calvarial or skull base lesions evident. Mucous retention cyst or 

polyp at


the inferior LEFT maxillary sinus. Unremarkable scalp. 





IMPRESSION: 


#. Negative for stigmata of acute or subacute ischemia. 


#. Relative mild encephalomalacia related to old LEFT MCA distribution infarct 

involving


the posterior LEFT frontal lobe and parietal lobe. 


#. Mild diffuse atrophy and stigmata of chronic small vessel ischemic disease.





____________________________________________________________


<Electronically signed by Cody Thrasher MD in OV>  18 1632


Dictated By: Cody Thrasher MD


Dictated Date/Time: 18 1632


Transcribed Date/Time: 18 1623


Copy to:





CARDIAC ECHO





Conclusions


There is normal left ventricular systolic function.


The estimated ejection fraction is 50-55%. 


Global left ventricular wall motion and contractility are within normal


limits.


Mild concentric left ventricular hypertrophy is observed.


There is an E to A reversal in the mitral valve flow pattern suggestive


of diastolic dysfunction.


There is mild to moderate aortic regurgitation. 


There is mild to moderate mitral regurgitation. 


There is mild to moderate tricuspid regurgitation.


There is evidence of mild to moderate pulmonary hypertension.


There is no prior echocardiogram available to compare with at this time.








Assess/Plan/Problems-Billing


Assessment: 





This is a 78 year old female patient transferred from a nursing home for femur 

fracture 2/2 unwitnessed fall. PMHx sig for COPD, previous CVAs, DM and HTN 

that presents with a right sided displaced subcapital femoral neck fracture and 

multiple masses concerning for malignancy. 





- Patient Problems


(1) Neoplasm


Current Visit: Yes   Status: Acute   Code(s): D49.9 - NEOPLASM OF UNSPECIFIED 

BEHAVIOR OF UNSPECIFIED SITE   SNOMED Code(s): 19808652


   Comment: 


CTA of the neck visualized local mass in the muscularture of the right shouder


Given the mass in the psoas, this is highly suspicious for metastatic disease/

sarcoma? 


CT chest, abdomen and pelvis with contrast shows numerous masses. 


Appreciate oncology input. 


FNA of left axillary mass pending. 


Will likely have no curative treatment options if metastatic cancer confirmed. 

   





(2) COPD (chronic obstructive pulmonary disease)


Current Visit: Yes   Status: Acute   Code(s): J44.9 - CHRONIC OBSTRUCTIVE 

PULMONARY DISEASE, UNSPECIFIED   SNOMED Code(s): 69572511


   Comment: 


CXR with COPD changes


Allergy to albuterol per record


Continue supportive O2


No signs of exacerbation.    





(3) Femoral neck fracture


Current Visit: Yes   Status: Acute   Code(s): S72.009A - FRACTURE OF UNSP PART 

OF NECK OF UNSP FEMUR, INIT   SNOMED Code(s): 9607820


   Comment: 


Unwitnessed fall, right side fracture


CT pelvis is showing psoas mass on the left/contralateral side to the fracture 

and a sclerotic lesion of the ischial tuberosity


Concern for pathologic fracture


Patient is NOT medically optimized for surgery. 


Discussed with daughter and she is not insistant on going forward with surgery


Will need to be transferred if surgery indicated. 


Does not appear to be main source of patient discomfort.    





(4) History of CVA (cerebrovascular accident)


Current Visit: Yes   Status: Acute   Code(s): Z86.73 - PRSNL HX OF TIA (TIA), 

AND CEREB INFRC W/O RESID DEFICITS   SNOMED Code(s): 294118641


   Comment: 


Per patient's daughter she is sometimes forgetful, however, she can usually 

converse and is appropriate


Unclear cause of current AMS. No Mass or CVA on MRI. 


AMS improving to a certain degree, able to tolerate oral intake. 


   





(5) Hypertension


Current Visit: Yes   Status: Acute   Code(s): I10 - ESSENTIAL (PRIMARY) 

HYPERTENSION   SNOMED Code(s): 45603386


   Comment: 


BP stable   





(6) Full code status


Current Visit: Yes   Status: Acute   Code(s): Z78.9 - OTHER SPECIFIED HEALTH 

STATUS   SNOMED Code(s): 988400004


   


Status and Disposition: 





Remain inpatient.

## 2018-07-15 NOTE — PN
Progress Note





- Progress Note


Date of Service: 07/15/18


SOAP: 


Subjective:


Pt is lying comfortably in bed. Nonverbal to questions asked








Objective:


PE- 77 y/o WDWN F lying comfortably in bed


RLE- skin intact, no warmth or erythema, frog leg position, calf is soft, + 2 

PT pulse. She cries out whenever she is touched regardless of where on the 

lower extremity this is, it is not believed a result of pain. The pt does have 

a small area of redness over the lateral malleolus. 





  


 Vital Signs











Temp  98.5 F   07/15/18 03:14


 


Pulse  72   07/15/18 03:14


 


Resp  14   07/15/18 08:14


 


BP  130/51   07/15/18 03:14


 


Pulse Ox  97   07/15/18 03:14








 Intake & Output











 07/14/18 07/15/18 07/15/18





 18:59 06:59 18:59


 


Intake Total 1053 980 


 


Output Total 175 400 


 


Balance 878 580 


 


Intake:   


 


  IV Fluids 978 980 


 


    NS (0.9%) 978 980 


 


  Oral 75  


 


Output:   


 


  Casey 175 400 














Assessment:


Right femoral neck fx








Plan:


NWB RLE


There is a high suspicion for cancer with metastasis and a pathological fx, Per 

Dr. Powers pending resuts of fine needle aspiration of axillary mass to further 

investigate.


Pt will likely need palliative care/hospice.  If the family would like surgery 

the patient would likely have to be transferred for a hemiarthroplasty in the 

presence of a pathological fracture


Cont pain control


Orthopedics will cont to follow


Soft boot to help with redness over the lateral malleolus on the right ankle

## 2018-07-16 LAB
BASOPHILS # BLD AUTO: 0.1 10^3/UL (ref 0–0.2)
EOSINOPHIL # BLD AUTO: 0.1 10^3/UL (ref 0–0.6)
HCT VFR BLD AUTO: 36 % (ref 35–47)
HGB BLD-MCNC: 11.7 G/DL (ref 12–16)
LYMPHOCYTES # BLD AUTO: 0.6 10^3/UL (ref 1–4.8)
MCH RBC QN AUTO: 28 PG (ref 27–31)
MCHC RBC AUTO-ENTMCNC: 32 G/DL (ref 31–36)
MCV RBC AUTO: 86 FL (ref 80–97)
MONOCYTES # BLD AUTO: 0.6 10^3/UL (ref 0–0.8)
NEUTROPHILS # BLD AUTO: 7.9 10^3/UL (ref 1.5–7.7)
NRBC # BLD AUTO: 0 10^3/UL
NRBC BLD QL AUTO: 0
PLATELET # BLD AUTO: 339 10^3/UL (ref 150–450)
RBC # BLD AUTO: 4.21 10^6/UL (ref 4–5.4)
WBC # BLD AUTO: 9.3 10^3/UL (ref 3.5–10.8)

## 2018-07-16 RX ADMIN — SODIUM CHLORIDE SCH MLS/HR: 900 IRRIGANT IRRIGATION at 06:31

## 2018-07-16 RX ADMIN — CYCLOBENZAPRINE HYDROCHLORIDE PRN MG: 10 TABLET, FILM COATED ORAL at 15:00

## 2018-07-16 RX ADMIN — INSULIN LISPRO SCH: 100 INJECTION, SOLUTION INTRAVENOUS; SUBCUTANEOUS at 12:05

## 2018-07-16 RX ADMIN — HYDROMORPHONE HYDROCHLORIDE PRN MG: 1 INJECTION, SOLUTION INTRAMUSCULAR; INTRAVENOUS; SUBCUTANEOUS at 19:55

## 2018-07-16 RX ADMIN — OXYCODONE HYDROCHLORIDE AND ACETAMINOPHEN PRN TAB: 5; 325 TABLET ORAL at 06:17

## 2018-07-16 RX ADMIN — DOCUSATE SODIUM SCH: 100 CAPSULE, LIQUID FILLED ORAL at 12:05

## 2018-07-16 RX ADMIN — CYCLOBENZAPRINE HYDROCHLORIDE PRN MG: 10 TABLET, FILM COATED ORAL at 22:14

## 2018-07-16 RX ADMIN — DOCUSATE SODIUM SCH MG: 100 CAPSULE, LIQUID FILLED ORAL at 22:14

## 2018-07-16 RX ADMIN — INSULIN LISPRO SCH UNIT: 100 INJECTION, SOLUTION INTRAVENOUS; SUBCUTANEOUS at 09:08

## 2018-07-16 RX ADMIN — CYCLOBENZAPRINE HYDROCHLORIDE PRN MG: 10 TABLET, FILM COATED ORAL at 06:18

## 2018-07-16 RX ADMIN — AMLODIPINE BESYLATE SCH MG: 5 TABLET ORAL at 09:44

## 2018-07-16 RX ADMIN — INSULIN LISPRO SCH: 100 INJECTION, SOLUTION INTRAVENOUS; SUBCUTANEOUS at 21:33

## 2018-07-16 RX ADMIN — ACETAMINOPHEN PRN MG: 325 TABLET ORAL at 22:15

## 2018-07-16 RX ADMIN — INSULIN LISPRO SCH: 100 INJECTION, SOLUTION INTRAVENOUS; SUBCUTANEOUS at 17:10

## 2018-07-16 RX ADMIN — OXYCODONE HYDROCHLORIDE AND ACETAMINOPHEN PRN TAB: 5; 325 TABLET ORAL at 11:51

## 2018-07-16 NOTE — PN
Progress Note





- Progress Note


Date of Service: 07/16/18


SOAP: 


Subjective:


[] Patient seen at bedside. She is more talkative today. This afternoon she 

states her right hip is non-painful at this time, but does at other times have 

severe pain. Earlier this morning, she did not recall that she had broken her 

hip.





Objective:


[]


 Vital Signs











Temp  98.4 F   07/16/18 07:44


 


Pulse  79   07/16/18 07:44


 


Resp  16   07/16/18 08:56


 


BP  122/46   07/16/18 07:44


 


Pulse Ox  94   07/16/18 07:44








 Intake & Output











 07/15/18 07/16/18 07/16/18





 18:59 06:59 18:59


 


Intake Total 1550 1995 


 


Output Total 225 350 


 


Balance 1325 1645 


 


Intake:   


 


  IV Fluids 980 1695 


 


    NS (0.9%) 980 1695 


 


  Oral 570 300 


 


Output:   


 


  Casey 225 350 








 Laboratory Last Values











WBC  9.3 10^3/ul (3.5-10.8)   07/16/18  04:45    


 


RBC  4.21 10^6/ul (4.00-5.40)   07/16/18  04:45    


 


Hgb  11.7 g/dl (12.0-16.0)  L  07/16/18  04:45    


 


Hct  36 % (35-47)   07/16/18  04:45    


 


MCV  86 fL (80-97)   07/16/18  04:45    


 


MCH  28 pg (27-31)   07/16/18  04:45    


 


MCHC  32 g/dl (31-36)   07/16/18  04:45    


 


RDW  16 % (10.5-15)  H  07/16/18  04:45    


 


Plt Count  339 10^3/ul (150-450)   07/16/18  04:45    


 


MPV  7.5 um3 (7.4-10.4)   07/16/18  04:45    


 


Neut % (Auto)  85.2 % (38-83)  H  07/16/18  04:45    


 


Lymph % (Auto)  6.4 % (25-47)  L  07/16/18  04:45    


 


Mono % (Auto)  6.3 % (0-7)   07/16/18  04:45    


 


Eos % (Auto)  1.5 % (0-6)   07/16/18  04:45    


 


Baso % (Auto)  0.6 % (0-2)   07/16/18  04:45    


 


Absolute Neuts (auto)  7.9 10^3/ul (1.5-7.7)  H  07/16/18  04:45    


 


Absolute Lymphs (auto)  0.6 10^3/ul (1.0-4.8)  L  07/16/18  04:45    


 


Absolute Monos (auto)  0.6 10^3/ul (0-0.8)   07/16/18  04:45    


 


Absolute Eos (auto)  0.1 10^3/ul (0-0.6)   07/16/18  04:45    


 


Absolute Basos (auto)  0.1 10^3/ul (0-0.2)   07/16/18  04:45    


 


Absolute Nucleated RBC  0 10^3/ul  07/16/18  04:45    


 


Nucleated RBC %  0   07/16/18  04:45    


 


INR (Anticoag Therapy)  0.94  (0.77-1.02)   07/12/18  10:50    


 


Sodium  145 mmol/L (135-145)   07/16/18  04:45    


 


Potassium  3.2 mmol/L (3.5-5.0)  L  07/16/18  04:45    


 


Chloride  109 mmol/L (101-111)   07/16/18  04:45    


 


Carbon Dioxide  29 mmol/L (22-32)   07/16/18  04:45    


 


Anion Gap  7 mmol/L (2-11)   07/16/18  04:45    


 


BUN  8 mg/dL (6-24)   07/16/18  04:45    


 


Creatinine  0.30 mg/dL (0.51-0.95)  L  07/16/18  04:45    


 


Est GFR ( Amer)  260.3  (>60)   07/16/18  04:45    


 


Est GFR (Non-Af Amer)  215.2  (>60)   07/16/18  04:45    


 


BUN/Creatinine Ratio  26.7  (8-20)  H  07/16/18  04:45    


 


Glucose  118 mg/dL ()  H  07/16/18  04:45    


 


POC Glucose (mg/dL)  151 mg/dL ()  H  07/16/18  07:41    


 


Calcium  8.2 mg/dL (8.6-10.3)  L  07/16/18  04:45    


 


Total Bilirubin  0.50 mg/dL (0.2-1.0)   07/11/18  18:17    


 


AST  44 U/L (13-39)  H  07/11/18  18:17    


 


ALT  28 U/L (7-52)   07/11/18  18:17    


 


Alkaline Phosphatase  229 U/L ()  H  07/11/18  18:17    


 


Total Protein  5.4 g/dL (6.4-8.9)  L  07/11/18  18:17    


 


Albumin  2.8 g/dL (3.2-5.2)  L  07/11/18  18:17    


 


Globulin  2.6 g/dL (2-4)   07/11/18  18:17    


 


Albumin/Globulin Ratio  1.1  (1-3)   07/11/18  18:17    


 


Urine Color  Rosalind   07/11/18  18:45    


 


Urine Appearance  Cloudy   07/11/18  18:45    


 


Urine pH  5.0  (5-9)   07/11/18  18:45    


 


Ur Specific Gravity  1.028  (1.010-1.030)   07/11/18  18:45    


 


Urine Protein  Negative  (Negative)   07/11/18  18:45    


 


Urine Ketones  Negative  (Negative)   07/11/18  18:45    


 


Urine Blood  Negative  (Negative)   07/11/18  18:45    


 


Urine Nitrate  Negative  (Negative)   07/11/18  18:45    


 


Urine Bilirubin  Negative  (Negative)   07/11/18  18:45    


 


Urine Urobilinogen  Negative  (Negative)   07/11/18  18:45    


 


Ur Leukocyte Esterase  Negative  (Negative)   07/11/18  18:45    


 


Urine Glucose  2+(150 mg/dl)  (Negative)  A  07/11/18  18:45    


 


Urine Ascorbic Acid  *  (Negative)  A  07/11/18  18:45    








General: patient seen at bedside. She is awake and answers questions regarding 

pain appropriately.


RLE: Sitting frog legged. SCD's in use. Sensation intact to light touch 

distally. Cap refill less than two seconds distally.


BL calves supple and nontender without erythema, edema or palpable cords.


Assessment:


[]Right femoral neck fx, likely pathologic due to presence of mets





Plan:


NWB RLE


Awaiting pathology results


Patient will likely require palliative care/hospice. 


If surgery is deemed an option for this patient she will need to transfer to a 

more appropriate facility to manage pathologic fracture

## 2018-07-16 NOTE — PN
Subjective


Date of Service: 18


Interval History: 





Patient much more alert today. Knows name, place, and year. Unable to name day 

of week, month or day of month. Patient stated she is hungry. Denies pain, F/C, 

N/V, abdominal pain, CP, SOB. Unclear how much of questioning patient 

understands. Often rambles nonsensically, often about food. 


Family History: Unchanged from Admission


Social History: Unchanged from Admission


Past Medical History: Unchanged from Admission





Objective


Active Medications: 








Acetaminophen (Tylenol Tab*)  650 mg PO Q4H PRN


   PRN Reason: FEVER/PAIN


   Last Admin: 18 20:46 Dose:  650 mg


Al Hydrox/Mg Hydrox/Simethicone (Maalox Plus*)  30 ml PO Q6H PRN


   PRN Reason: INDIGESTION


Amlodipine Besylate (Norvasc Tab*)  10 mg PO DAILY [facility]


   Last Admin: 18 09:44 Dose:  10 mg


Cyclobenzaprine HCl (Flexeril Tab*)  5 mg PO TID PRN


   PRN Reason: SPASMS


   Last Admin: 18 06:18 Dose:  5 mg


Dextrose (D50w Syringe 50 Ml*)  12.5 gm IV PUSH .FOR FS < 60 - SS PRN


   PRN Reason: FS < 60


Diazepam (Valium Inj (Nf))  2 mg IV Q8H PRN


   PRN Reason: Spasm


Docusate Sodium (Colace Cap*)  100 mg PO BID [facility]


   Last Admin: 07/15/18 20:45 Dose:  100 mg


Hydromorphone HCl (Dilaudid Inj*)  1 mg IV Q3H PRN


   PRN Reason: PAIN


   Last Admin: 07/15/18 06:29 Dose:  1 mg


Insulin Human Lispro (Humalog*)  0 units SUBCUT ACHS [facility]; Protocol


   Last Admin: 18 09:08 Dose:  1 unit


Ipratropium Bromide (Atrovent 0.5 Mg Neb.Sol*)  0.5 mg INH Q4H PRN


   PRN Reason: SOB/WHEEZING


Magnesium Hydroxide (Milk Of Magnesia Liq*)  30 ml PO Q4H PRN


   PRN Reason: CONSTIPATION


   Last Admin: 07/15/18 20:45 Dose:  30 ml


Ondansetron HCl (Zofran Inj*)  4 mg IV Q4H PRN


   PRN Reason: NAUSEA/VOMITING


Oxycodone/Acetaminophen (Percocet 5/325 Tab*)  1 tab PO Q4H PRN


   PRN Reason: Pain


   Last Admin: 18 06:17 Dose:  1 tab








 Vital Signs - 8 hr











  18





  04:05 06:17 06:18


 


Temperature 97.3 F  


 


Pulse Rate 72  


 


Respiratory 16 16 16





Rate   


 


Blood Pressure 120/43  





(mmHg)   


 


O2 Sat by Pulse 99  





Oximetry   














  18





  07:44 07:51 08:02


 


Temperature 98.4 F  


 


Pulse Rate 79  


 


Respiratory 16 16 16





Rate   


 


Blood Pressure 122/46  





(mmHg)   


 


O2 Sat by Pulse 94  





Oximetry   














  18





  08:56


 


Temperature 


 


Pulse Rate 


 


Respiratory 16





Rate 


 


Blood Pressure 





(mmHg) 


 


O2 Sat by Pulse 





Oximetry 











Oxygen Devices in Use Now: Nasal Cannula


Appearance: Patient is a 77yo female who appears stated age and is sitting in 

the bed in NAD.


Eyes: No Scleral Icterus, PERRLA


Ears/Nose/Mouth/Throat: NL Teeth, Lips, Gums, Clear Oropharnyx, Mucous 

Membranes Moist


Neck: NL Appearance and Movements; NL JVP, Trachea Midline


Respiratory: Symmetrical Chest Expansion and Respiratory Effort, - - 

Diminished. 


Cardiovascular: RRR, No Edema, - - JVD, Mildly tachycardic. 


Abdominal: NL Sounds; No Tenderness; No Distention, No Hepatosplenomegaly


Lymphatic: No Cervical Adenopathy, - - Stable large lymphadenopathy. 


Extremities: No Clubbing, Cyanosis, - - Trace Edema. Pulses 2+. Right leg 

flexed. Patient yells out with minor manipulation. 


Skin: No Rash or Ulcers, No Nodules or Sclerosis


Neurological: NL Sensation, NL Muscle Strength and Tone, - - CN II-XII intact.


Result Diagrams: 


 18 04:45





 18 04:45


Microbiology and Other Data: 


 Microbiology











 18 15:40 Nasal Screen MRSA (PCR) - Final





 Nasal    Mrsa Not Detected











Diagnostic Imaging: 





Patient Name:         ROXANA KNIGHT                                        

                        Medical Record#: [id number]


Ordering Physician: Juana Pfeiffer MD                                            

                        Acct.#: [id number]


:     1940         Age: 78   Sex: F                                   

                        Location: SURGICAL STAY UNIT


Exam Date: 18                                                       

                        ADM Status: ADM IN


Order Information:                         CT PELVIS W/O


Accession Number:                          [id number]


CPT:                                       [id number]


INDICATION: Traumatic fracture right hip.





COMPARISON:  Comparison is made with a prior x-ray study of the right femur and 

pelvis


from 2018.





TECHNIQUE: Contiguous axial sections were obtained through the pelvis without 

intravenous


or oral contrast. Images were reconstructed in the coronal and sagittal planes.





FINDINGS:  The bones appear osteoporotic. There is a subcapital right femoral 

neck


fracture. The fracture fragments are overriding. There is anterior displacement 

of the


distal fragment relative the proximal fragment and rotation of the femur. There 

is varus


angulation. There is nonspecific sclerotic change in the ischial tuberosity.  

There is


mild to moderate bilateral osteoarthritic change in the hips.





There is marked enlargement of the left psoas and iliacus muscles consistent 

with a mass,


bulky adenopathy or hematoma.





The visualized portion of the small bowel and colon appear nondistended. There 

is a


catheter within the urinary bladder.





No free intraperitoneal air or fluid is seen.





IMPRESSION:  


1. DISPLACED ANGULATED RIGHT SUBCAPITAL FEMORAL NECK FRACTURE.


2. SCLEROTIC LESION WITHIN THE RIGHT ISCHIAL TUBEROSITY.


3. ENLARGEMENT OF THE LEFT PSOAS AND ILIACUS MUSCLES MOST CONSISTENT WITH A MASS

, BULKY


ADENOPATHY OR HEMATOMA. RECOMMEND A CT OF THE CHEST, ABDOMEN AND PELVIS WITH 

CONTRAST FOR


FURTHER EVALUATION.





____________________________________________________________


<Electronically signed by Corey Apodaca MD in OV>  18


Dictated By: Corey Apodaca MD


Dictated Date/Time: 18


Transcribed Date/Time: 18


Copy to:





CTA HEAD and NECK Conclusion:


[facility]


Patient Name:ROXANA KNIGHT                                          MR:

[id number]                                  : 1940














LYMPH NODES: There is biapical emphysematous change.





BONES AND SOFT TISSUES: No bone or soft tissue abnormalities are noted.








CTA HEAD:





INTRACRANIAL CIRCULATION: There is no aneurysm, vascular malformation, occlusion

, or


stenosis of the visualized intracranial circulation. The anterior communicating 

artery


complex is clear. Bilateral posterior communicating arteries are identified.





VENOUS CIRCULATION: The venous system is unremarkable.





PERFUSION: There is no obvious parenchymal perfusion deficit.





HEMORRHAGE/INFARCT: There is no hemorrhage or acute infarct.


MASSES/SHIFT: There is no mass or shift.


EXTRA-AXIAL SPACES: There are no extra-axial fluid collections.


SULCI AND VENTRICLES: The sulci and ventricles are normal in size and position 

for the


patient's stated age.





CEREBRUM: There is hypoattenuation of the periventricular and subcortical white 

matter. 


BRAINSTEM: There are no focal parenchymal abnormalities.


CEREBELLUM: There are no focal parenchymal abnormalities.





PARANASAL SINUSES: The paranasal sinuses are clear.


ORBITS: The orbits are unremarkable.


BONES AND SOFT TISSUE: Degenerative changes are noted of the spine. There are 

multiple


lobulated masses of the right shoulder musculature, and evaluated on the current


examination.





OTHER: There is no abnormal enhancement.








IMPRESSION: 


1.  NO INTERNAL CAROTID ARTERY STENOSIS BY NASCET CRITERIA.


2.  NO ANEURYSM, VASCULAR MALFORMATION, OCCLUSION, OR STENOSIS OF THE VISUALIZED


INTRACRANIAL CIRCULATION..


3.  ATHEROSCLEROSIS.


4.  PROXIMAL VESSEL ISCHEMIC CHANGE.


5.  NO ACUTE INTRACRANIAL PATHOLOGY.


6.  MULTIPLE LOBULATED MASSES OF THE MUSCULATURE OF THE RIGHT SHOULDER GIRDLE, 

EVALUATED


ON THE CURRENT EXAMINATION SUSPICIOUS FOR SARCOMA VERSUS METASTATIC DISEASE.





MRI BRAIN


Patient Name:         ROXANA KNIGHT                                        

                        Medical Record#: [id number]


Ordering Physician: Demetra Abdullahi NP                                   

                        Acct.#: [id number]


:     1940         Age: 78   Sex: F                                   

                        Location: SURGICAL STAY UNIT


Exam Date: 18 1507                                                       

                        ADM Status: ADM IN


Order Information:                         MRI BRAIN W/O


Accession Number:                          [id number]


CPT:                                       [id number]


Indication: Worsening confusion and weakness. Previous CVAs with residual RIGHT-

sided


weakness.





Comparison: CT and CTA head neck exams of the same date.





Technique: Voxiea 1.5 Hallie [id number] with GEM suite.  MRI brain without 

contrast. 





Report: Diffusion series is negative for acute or subacute ischemia. 

Susceptibility series


is negative for stigmata of hemosiderin deposition to indicate previous 

hemorrhage.





Mild prominence of the cerebral sulci and cerebellar fissures collecting 

atrophy.


Proportional ventricular enlargement. Patent basal cisterns. Disproportionate 

mild volume


loss and T2 hyperintense ischemic gliosis at the LEFT middle cerebral artery 

distribution


of the frontal and parietal lobes consistent with sequela of previous infarct. 

Additional


few nonspecific T2 hyperintense foci within the cerebral white matter without 

mass effect.


No extra-axial fluid collection evident. Preserved major intracranial flow-

voids.


Unremarkable orbital contents.





No suspicious calvarial or skull base lesions evident. Mucous retention cyst or 

polyp at


the inferior LEFT maxillary sinus. Unremarkable scalp. 





IMPRESSION: 


#. Negative for stigmata of acute or subacute ischemia. 


#. Relative mild encephalomalacia related to old LEFT MCA distribution infarct 

involving


the posterior LEFT frontal lobe and parietal lobe. 


#. Mild diffuse atrophy and stigmata of chronic small vessel ischemic disease.





____________________________________________________________


<Electronically signed by Cody Thrasher MD in OV>  18 1632


Dictated By: Cody Thrasher MD


Dictated Date/Time: 18 1632


Transcribed Date/Time: 18 1623


Copy to:





CARDIAC ECHO





Conclusions


There is normal left ventricular systolic function.


The estimated ejection fraction is 50-55%. 


Global left ventricular wall motion and contractility are within normal


limits.


Mild concentric left ventricular hypertrophy is observed.


There is an E to A reversal in the mitral valve flow pattern suggestive


of diastolic dysfunction.


There is mild to moderate aortic regurgitation. 


There is mild to moderate mitral regurgitation. 


There is mild to moderate tricuspid regurgitation.


There is evidence of mild to moderate pulmonary hypertension.


There is no prior echocardiogram available to compare with at this time.








Assess/Plan/Problems-Billing


Assessment: 





This is a 78 year old female patient transferred from a nursing home for femur 

fracture 2/2 unwitnessed fall. PMHx sig for COPD, previous CVAs, DM and HTN 

that presents with a right sided displaced subcapital femoral neck fracture and 

multiple masses concerning for malignancy. 





- Patient Problems


(1) Neoplasm


Current Visit: Yes   Status: Acute   Code(s): D49.9 - NEOPLASM OF UNSPECIFIED 

BEHAVIOR OF UNSPECIFIED SITE   SNOMED Code(s): 60131375


   Comment: 


CTA of the neck visualized local mass in the muscularture of the right shouder


Given the mass in the psoas, this is highly suspicious for metastatic disease/

sarcoma? 


CT chest, abdomen and pelvis with contrast shows numerous masses. 


Appreciate oncology input. 


FNA of left axillary mass pending. 


Will likely have no curative treatment options if metastatic cancer confirmed. 

   





(2) COPD (chronic obstructive pulmonary disease)


Current Visit: Yes   Status: Acute   Code(s): J44.9 - CHRONIC OBSTRUCTIVE 

PULMONARY DISEASE, UNSPECIFIED   SNOMED Code(s): 42567508


   Comment: 


CXR with COPD changes


Allergy to albuterol per record


Continue supportive O2


Wheezing this AM. Ipratropium inhalers as tolerated.     





(3) Femoral neck fracture


Current Visit: Yes   Status: Acute   Code(s): S72.009A - FRACTURE OF UNSP PART 

OF NECK OF UNSP FEMUR, INIT   SNOMED Code(s): 2885425


   Comment: 


Unwitnessed fall, right side fracture


CT pelvis is showing psoas mass on the left/contralateral side to the fracture 

and a sclerotic lesion of the ischial tuberosity


Concern for pathologic fracture


Patient is NOT medically optimized for surgery. 


Discussed with daughter and she is not insistant on going forward with surgery


Will need to be transferred if surgery indicated. 


Does not appear to be main source of patient discomfort.    





(4) History of CVA (cerebrovascular accident)


Current Visit: Yes   Status: Acute   Code(s): Z86.73 - PRSNL HX OF TIA (TIA), 

AND CEREB INFRC W/O RESID DEFICITS   SNOMED Code(s): 261409887


   Comment: 


Per patient's daughter she is sometimes forgetful, however, she can usually 

converse and is appropriate.


Unclear cause of current AMS. No Mass or CVA on MRI. 


AMS greatly improved from yesterday. Near baseline from reports. 


   





(5) Fluid overload


Current Visit: Yes   Status: Acute   Code(s): E87.70 - FLUID OVERLOAD, 

UNSPECIFIED   SNOMED Code(s): 78361632


   Comment: 


Normal renal function with JVD and diastolic dysfunction on Echo. JVD and mild 

edema. Oliguria. Will trial lasix and monitor breathing.    





(6) Hypertension


Current Visit: Yes   Status: Acute   Code(s): I10 - ESSENTIAL (PRIMARY) 

HYPERTENSION   SNOMED Code(s): 34621616


   Comment: 


BP stable   





(7) Full code status


Current Visit: Yes   Status: Acute   Code(s): Z78.9 - OTHER SPECIFIED HEALTH 

STATUS   SNOMED Code(s): 602670093


   


Status and Disposition: 





Remain inpatient.

## 2018-07-17 LAB
BASOPHILS # BLD AUTO: 0 10^3/UL (ref 0–0.2)
EOSINOPHIL # BLD AUTO: 0.2 10^3/UL (ref 0–0.6)
HCT VFR BLD AUTO: 37 % (ref 35–47)
HGB BLD-MCNC: 12.2 G/DL (ref 12–16)
LYMPHOCYTES # BLD AUTO: 0.5 10^3/UL (ref 1–4.8)
MCH RBC QN AUTO: 28 PG (ref 27–31)
MCHC RBC AUTO-ENTMCNC: 33 G/DL (ref 31–36)
MCV RBC AUTO: 85 FL (ref 80–97)
MONOCYTES # BLD AUTO: 0.4 10^3/UL (ref 0–0.8)
NEUTROPHILS # BLD AUTO: 6.5 10^3/UL (ref 1.5–7.7)
NRBC # BLD AUTO: 0 10^3/UL
NRBC BLD QL AUTO: 0.1
PLATELET # BLD AUTO: 335 10^3/UL (ref 150–450)
RBC # BLD AUTO: 4.32 10^6/UL (ref 4–5.4)
WBC # BLD AUTO: 7.6 10^3/UL (ref 3.5–10.8)

## 2018-07-17 RX ADMIN — SODIUM CHLORIDE SCH MLS/HR: 900 IRRIGANT IRRIGATION at 19:55

## 2018-07-17 RX ADMIN — DOCUSATE SODIUM SCH: 100 CAPSULE, LIQUID FILLED ORAL at 09:59

## 2018-07-17 RX ADMIN — HYDROMORPHONE HYDROCHLORIDE PRN MG: 1 INJECTION, SOLUTION INTRAMUSCULAR; INTRAVENOUS; SUBCUTANEOUS at 18:46

## 2018-07-17 RX ADMIN — INSULIN LISPRO SCH: 100 INJECTION, SOLUTION INTRAVENOUS; SUBCUTANEOUS at 17:19

## 2018-07-17 RX ADMIN — AMLODIPINE BESYLATE SCH MG: 5 TABLET ORAL at 10:55

## 2018-07-17 RX ADMIN — HYDROMORPHONE HYDROCHLORIDE PRN MG: 1 INJECTION, SOLUTION INTRAMUSCULAR; INTRAVENOUS; SUBCUTANEOUS at 11:13

## 2018-07-17 RX ADMIN — METOPROLOL TARTRATE SCH MG: 25 TABLET, FILM COATED ORAL at 21:57

## 2018-07-17 RX ADMIN — HYDROMORPHONE HYDROCHLORIDE PRN MG: 1 INJECTION, SOLUTION INTRAMUSCULAR; INTRAVENOUS; SUBCUTANEOUS at 03:03

## 2018-07-17 RX ADMIN — INSULIN LISPRO SCH: 100 INJECTION, SOLUTION INTRAVENOUS; SUBCUTANEOUS at 07:28

## 2018-07-17 RX ADMIN — AMLODIPINE BESYLATE SCH: 5 TABLET ORAL at 09:59

## 2018-07-17 RX ADMIN — INSULIN LISPRO SCH: 100 INJECTION, SOLUTION INTRAVENOUS; SUBCUTANEOUS at 21:47

## 2018-07-17 RX ADMIN — DOCUSATE SODIUM SCH MG: 100 CAPSULE, LIQUID FILLED ORAL at 22:02

## 2018-07-17 RX ADMIN — HYDROMORPHONE HYDROCHLORIDE PRN MG: 1 INJECTION, SOLUTION INTRAMUSCULAR; INTRAVENOUS; SUBCUTANEOUS at 15:45

## 2018-07-17 RX ADMIN — INSULIN LISPRO SCH UNIT: 100 INJECTION, SOLUTION INTRAVENOUS; SUBCUTANEOUS at 11:49

## 2018-07-17 RX ADMIN — DOCUSATE SODIUM SCH MG: 100 CAPSULE, LIQUID FILLED ORAL at 10:59

## 2018-07-17 NOTE — PN
Progress Note





- Progress Note


Date of Service: 07/17/18


SOAP: 


Subjective:


[]Patient seen at bedside, she is eating breakfast. She denies pain currently.





Objective:


General: patient seen at bedside. She is awake and answers questions regarding 

pain appropriately.


RLE: Sitting frog legged. Thigh is soft. DP1+, Cap refill less than two seconds 

distally.


BL calves supple and nontender without erythema, edema or palpable cords.





Assessment:


[]Right femoral neck fx, likely pathologic due to presence of mets





Plan:


NWB RLE


Axillary FNA shows follicular lymphoma


Patient will likely require palliative care/hospice. 


If surgery is deemed an option for this patient she will need to transfer to a 

more appropriate facility to manage pathologic fracture

## 2018-07-17 NOTE — PN
Subjective


Date of Service: 18


Interval History: 





Patient more alert. Still does not respond to questioning. Mental status seems 

to fluctuate with pain medication dosing. Discussed with daughter and patient 

will be a DNR. Awaiting repeat FNA and histology. 


Family History: Unchanged from Admission


Social History: Unchanged from Admission


Past Medical History: Unchanged from Admission





Objective


Active Medications: 








Acetaminophen (Tylenol Tab*)  650 mg PO Q4H PRN


   PRN Reason: FEVER/PAIN


   Last Admin: 18 22:15 Dose:  650 mg


Al Hydrox/Mg Hydrox/Simethicone (Maalox Plus*)  30 ml PO Q6H PRN


   PRN Reason: INDIGESTION


Amlodipine Besylate (Norvasc Tab*)  10 mg PO DAILY [facility]


   Last Admin: 18 10:55 Dose:  10 mg


Cyclobenzaprine HCl (Flexeril Tab*)  5 mg PO TID PRN


   PRN Reason: SPASMS


   Last Admin: 18 22:14 Dose:  5 mg


Dextrose (D50w Syringe 50 Ml*)  12.5 gm IV PUSH .FOR FS < 60 - SS PRN


   PRN Reason: FS < 60


Diazepam (Valium Inj (Nf))  2 mg IV Q8H PRN


   PRN Reason: Spasm


   Last Admin: 18 15:32 Dose:  2 mg


Docusate Sodium (Colace Cap*)  100 mg PO BID [facility]


   Last Admin: 18 10:59 Dose:  100 mg


Hydromorphone HCl (Dilaudid Inj*)  1 mg IV Q3H PRN


   PRN Reason: PAIN


   Last Admin: 18 11:13 Dose:  1 mg


Insulin Human Lispro (Humalog*)  0 units SUBCUT ACHS [facility]; Protocol


   Last Admin: 18 11:49 Dose:  1 unit


Ipratropium Bromide (Atrovent 0.5 Mg Neb.Sol*)  0.5 mg INH Q4H PRN


   PRN Reason: SOB/WHEEZING


Magnesium Hydroxide (Milk Of Magnesia Liq*)  30 ml PO Q4H PRN


   PRN Reason: CONSTIPATION


   Last Admin: 07/15/18 20:45 Dose:  30 ml


Ondansetron HCl (Zofran Inj*)  4 mg IV Q4H PRN


   PRN Reason: NAUSEA/VOMITING


Oxycodone/Acetaminophen (Percocet 5/325 Tab*)  1 tab PO Q4H PRN


   PRN Reason: Pain


   Last Admin: 18 11:51 Dose:  1 tab








 Vital Signs - 8 hr











  18





  07:35 09:45 11:13


 


Temperature 97.4 F  


 


Pulse Rate 72  


 


Respiratory 12 16 16





Rate   


 


Blood Pressure 132/45  





(mmHg)   


 


O2 Sat by Pulse 99  





Oximetry   














  18





  11:37 13:02


 


Temperature 98.6 F 


 


Pulse Rate 94 


 


Respiratory 21 16





Rate  


 


Blood Pressure 129/60 





(mmHg)  


 


O2 Sat by Pulse 90 





Oximetry  











Oxygen Devices in Use Now: Nasal Cannula


Appearance: Patient is a 79yo female who appears stated age and is sitting in 

the bed in NAD.


Eyes: No Scleral Icterus, PERRLA


Ears/Nose/Mouth/Throat: NL Teeth, Lips, Gums, Clear Oropharnyx, Mucous 

Membranes Moist


Neck: NL Appearance and Movements; NL JVP, Trachea Midline


Respiratory: Symmetrical Chest Expansion and Respiratory Effort, - - Diminished 

throughout, Wheezing. 


Cardiovascular: NL Sounds; No Murmurs; No JVD, RRR, No Edema


Abdominal: NL Sounds; No Tenderness; No Distention, No Hepatosplenomegaly


Lymphatic: No Cervical Adenopathy, - - Large inginual and axillary 

lymphadenpathy. 


Extremities: No Edema, No Clubbing, Cyanosis


Skin: No Rash or Ulcers, No Nodules or Sclerosis


Neurological: - - Responds with eyes to voice. Unable to do any other 

neurological examination. 


Result Diagrams: 


 18 06:27





 18 06:27


Microbiology and Other Data: 


 Microbiology











 18 15:40 Nasal Screen MRSA (PCR) - Final





 Nasal    Mrsa Not Detected











Diagnostic Imaging: 





Patient Name:         ROXANA KNIGHT                                        

                        Medical Record#: [id number]


Ordering Physician: Juana Pfeiffer MD                                            

                        Acct.#: [id number]


:     1940         Age: 78   Sex: F                                   

                        Location: SURGICAL STAY UNIT


Exam Date: 18                                                       

                        ADM Status: ADM IN


Order Information:                         CT PELVIS W/O


Accession Number:                          [id number]


CPT:                                       [id number]


INDICATION: Traumatic fracture right hip.





COMPARISON:  Comparison is made with a prior x-ray study of the right femur and 

pelvis


from 2018.





TECHNIQUE: Contiguous axial sections were obtained through the pelvis without 

intravenous


or oral contrast. Images were reconstructed in the coronal and sagittal planes.





FINDINGS:  The bones appear osteoporotic. There is a subcapital right femoral 

neck


fracture. The fracture fragments are overriding. There is anterior displacement 

of the


distal fragment relative the proximal fragment and rotation of the femur. There 

is varus


angulation. There is nonspecific sclerotic change in the ischial tuberosity.  

There is


mild to moderate bilateral osteoarthritic change in the hips.





There is marked enlargement of the left psoas and iliacus muscles consistent 

with a mass,


bulky adenopathy or hematoma.





The visualized portion of the small bowel and colon appear nondistended. There 

is a


catheter within the urinary bladder.





No free intraperitoneal air or fluid is seen.





IMPRESSION:  


1. DISPLACED ANGULATED RIGHT SUBCAPITAL FEMORAL NECK FRACTURE.


2. SCLEROTIC LESION WITHIN THE RIGHT ISCHIAL TUBEROSITY.


3. ENLARGEMENT OF THE LEFT PSOAS AND ILIACUS MUSCLES MOST CONSISTENT WITH A MASS

, BULKY


ADENOPATHY OR HEMATOMA. RECOMMEND A CT OF THE CHEST, ABDOMEN AND PELVIS WITH 

CONTRAST FOR


FURTHER EVALUATION.





____________________________________________________________


<Electronically signed by Corey Apodaca MD in OV>  18


Dictated By: Corey Apodaca MD


Dictated Date/Time: 18


Transcribed Date/Time: 18


Copy to:





CTA HEAD and NECK Conclusion:


[facility] IMAGING


Patient Name:ROXANA KNIGHT                                          MR:

[id number]                                  : 1940














LYMPH NODES: There is biapical emphysematous change.





BONES AND SOFT TISSUES: No bone or soft tissue abnormalities are noted.








CTA HEAD:





INTRACRANIAL CIRCULATION: There is no aneurysm, vascular malformation, occlusion

, or


stenosis of the visualized intracranial circulation. The anterior communicating 

artery


complex is clear. Bilateral posterior communicating arteries are identified.





VENOUS CIRCULATION: The venous system is unremarkable.





PERFUSION: There is no obvious parenchymal perfusion deficit.





HEMORRHAGE/INFARCT: There is no hemorrhage or acute infarct.


MASSES/SHIFT: There is no mass or shift.


EXTRA-AXIAL SPACES: There are no extra-axial fluid collections.


SULCI AND VENTRICLES: The sulci and ventricles are normal in size and position 

for the


patient's stated age.





CEREBRUM: There is hypoattenuation of the periventricular and subcortical white 

matter. 


BRAINSTEM: There are no focal parenchymal abnormalities.


CEREBELLUM: There are no focal parenchymal abnormalities.





PARANASAL SINUSES: The paranasal sinuses are clear.


ORBITS: The orbits are unremarkable.


BONES AND SOFT TISSUE: Degenerative changes are noted of the spine. There are 

multiple


lobulated masses of the right shoulder musculature, and evaluated on the current


examination.





OTHER: There is no abnormal enhancement.








IMPRESSION: 


1.  NO INTERNAL CAROTID ARTERY STENOSIS BY NASCET CRITERIA.


2.  NO ANEURYSM, VASCULAR MALFORMATION, OCCLUSION, OR STENOSIS OF THE VISUALIZED


INTRACRANIAL CIRCULATION..


3.  ATHEROSCLEROSIS.


4.  PROXIMAL VESSEL ISCHEMIC CHANGE.


5.  NO ACUTE INTRACRANIAL PATHOLOGY.


6.  MULTIPLE LOBULATED MASSES OF THE MUSCULATURE OF THE RIGHT SHOULDER GIRDLE, 

EVALUATED


ON THE CURRENT EXAMINATION SUSPICIOUS FOR SARCOMA VERSUS METASTATIC DISEASE.





MRI BRAIN


Patient Name:         ROXANA KNIGHT                                        

                        Medical Record#: [id number]


Ordering Physician: Demetra Abdullahi NP                                   

                        Acct.#: [id number]


:     1940         Age: 78   Sex: F                                   

                        Location: SURGICAL STAY UNIT


Exam Date: 18 1507                                                       

                        ADM Status: ADM IN


Order Information:                         MRI BRAIN W/O


Accession Number:                          [id number]


CPT:                                       [id number]


Indication: Worsening confusion and weakness. Previous CVAs with residual RIGHT-

sided


weakness.





Comparison: CT and CTA head neck exams of the same date.





Technique: [employer] Optima 1.5 Hallie KH699K with GEM suite.  MRI brain without 

contrast. 





Report: Diffusion series is negative for acute or subacute ischemia. 

Susceptibility series


is negative for stigmata of hemosiderin deposition to indicate previous 

hemorrhage.





Mild prominence of the cerebral sulci and cerebellar fissures collecting 

atrophy.


Proportional ventricular enlargement. Patent basal cisterns. Disproportionate 

mild volume


loss and T2 hyperintense ischemic gliosis at the LEFT middle cerebral artery 

distribution


of the frontal and parietal lobes consistent with sequela of previous infarct. 

Additional


few nonspecific T2 hyperintense foci within the cerebral white matter without 

mass effect.


No extra-axial fluid collection evident. Preserved major intracranial flow-

voids.


Unremarkable orbital contents.





No suspicious calvarial or skull base lesions evident. Mucous retention cyst or 

polyp at


the inferior LEFT maxillary sinus. Unremarkable scalp. 





IMPRESSION: 


#. Negative for stigmata of acute or subacute ischemia. 


#. Relative mild encephalomalacia related to old LEFT MCA distribution infarct 

involving


the posterior LEFT frontal lobe and parietal lobe. 


#. Mild diffuse atrophy and stigmata of chronic small vessel ischemic disease.





____________________________________________________________


<Electronically signed by Cody Thrasher MD in OV>  18 1632


Dictated By: Cody Thrasher MD


Dictated Date/Time: 18 1632


Transcribed Date/Time: 18 1623


Copy to:





CARDIAC ECHO





Conclusions


There is normal left ventricular systolic function.


The estimated ejection fraction is 50-55%. 


Global left ventricular wall motion and contractility are within normal


limits.


Mild concentric left ventricular hypertrophy is observed.


There is an E to A reversal in the mitral valve flow pattern suggestive


of diastolic dysfunction.


There is mild to moderate aortic regurgitation. 


There is mild to moderate mitral regurgitation. 


There is mild to moderate tricuspid regurgitation.


There is evidence of mild to moderate pulmonary hypertension.


There is no prior echocardiogram available to compare with at this time.








Assess/Plan/Problems-Billing


Assessment: 





This is a 78 year old female patient transferred from a nursing home for femur 

fracture 2/2 unwitnessed fall. PMHx sig for COPD, previous CVAs, DM and HTN 

that presents with a right sided displaced subcapital femoral neck fracture and 

multiple masses concerning for malignancy. 





- Patient Problems


(1) Neoplasm


Current Visit: Yes   Status: Acute   Code(s): D49.9 - NEOPLASM OF UNSPECIFIED 

BEHAVIOR OF UNSPECIFIED SITE   SNOMED Code(s): 25007866


   Comment: 


CTA of the neck visualized local mass in the muscularture of the right shouder


Given the mass in the psoas, this is highly suspicious for metastatic disease/

sarcoma? 


CT chest, abdomen and pelvis with contrast shows numerous masses. 


Appreciate oncology input. 


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 Does not explain other 

masses. 


FNA of shoulder pending. 


Will likely have no curative treatment options if metastatic cancer confirmed. 

   





(2) COPD (chronic obstructive pulmonary disease)


Current Visit: Yes   Status: Acute   Code(s): J44.9 - CHRONIC OBSTRUCTIVE 

PULMONARY DISEASE, UNSPECIFIED   SNOMED Code(s): 22169148


   Comment: 


CXR with COPD changes


Allergy to albuterol per record


Continue supportive O2


No signs of exacerbation.    





(3) Femoral neck fracture


Current Visit: Yes   Status: Acute   Code(s): S72.009A - FRACTURE OF UNSP PART 

OF NECK OF UNSP FEMUR, INIT   SNOMED Code(s): 4015674


   Comment: 


Unwitnessed fall, right side fracture


CT pelvis is showing psoas mass on the left/contralateral side to the fracture 

and a sclerotic lesion of the ischial tuberosity


Concern for pathologic fracture


Patient is NOT medically optimized for surgery. 


Discussed with daughter and she is not insistant on going forward with surgery


Will need to be transferred if surgery indicated. 


Does not appear to be main source of patient discomfort.    





(4) History of CVA (cerebrovascular accident)


Current Visit: Yes   Status: Acute   Code(s): Z86.73 - PRSNL HX OF TIA (TIA), 

AND CEREB INFRC W/O RESID DEFICITS   SNOMED Code(s): 024435750


   Comment: 


Per patient's daughter she is sometimes forgetful, however, she can usually 

converse and is appropriate.


Unclear cause of current AMS. No Mass or CVA on MRI. 


AMS stable from yesterday. Near baseline. 


   





(5) Fluid overload


Current Visit: Yes   Status: Acute   Code(s): E87.70 - FLUID OVERLOAD, 

UNSPECIFIED   SNOMED Code(s): 54142816


   Comment: 


JVD resolved. Persistent hypoxia, normal lung exam with no edema.    





(6) Hypertension


Current Visit: Yes   Status: Acute   Code(s): I10 - ESSENTIAL (PRIMARY) 

HYPERTENSION   SNOMED Code(s): 44142699


   Comment: 


BP stable   





(7) Full code status


Current Visit: Yes   Status: Acute   Code(s): Z78.9 - OTHER SPECIFIED HEALTH 

STATUS   SNOMED Code(s): 852424086


   


Status and Disposition: 





Remain inpatient.

## 2018-07-17 NOTE — PN
Hospitalist Progress Note


Date of Service: 07/17/18





Patient went into rapid Afib with rates in the 140s. Not in acute distress. No 

chest pain. Stable Vital signs. Treated with Metoprolol, fluids, and magnesium 

and transferred to telemetry unit. Unable to contact family to guide care.

## 2018-07-18 LAB
BASOPHILS # BLD AUTO: 0 10^3/UL (ref 0–0.2)
EOSINOPHIL # BLD AUTO: 0.1 10^3/UL (ref 0–0.6)
HCT VFR BLD AUTO: 36 % (ref 35–47)
HGB BLD-MCNC: 11.8 G/DL (ref 12–16)
LYMPHOCYTES # BLD AUTO: 0.6 10^3/UL (ref 1–4.8)
MCH RBC QN AUTO: 28 PG (ref 27–31)
MCHC RBC AUTO-ENTMCNC: 33 G/DL (ref 31–36)
MCV RBC AUTO: 85 FL (ref 80–97)
MONOCYTES # BLD AUTO: 0.6 10^3/UL (ref 0–0.8)
NEUTROPHILS # BLD AUTO: 8.9 10^3/UL (ref 1.5–7.7)
NRBC # BLD AUTO: 0 10^3/UL
NRBC BLD QL AUTO: 0.1
PLATELET # BLD AUTO: 349 10^3/UL (ref 150–450)
RBC # BLD AUTO: 4.2 10^6/UL (ref 4–5.4)
WBC # BLD AUTO: 10.2 10^3/UL (ref 3.5–10.8)

## 2018-07-18 RX ADMIN — DOCUSATE SODIUM SCH: 100 CAPSULE, LIQUID FILLED ORAL at 20:49

## 2018-07-18 RX ADMIN — SODIUM CHLORIDE SCH MLS/HR: 900 IRRIGANT IRRIGATION at 20:51

## 2018-07-18 RX ADMIN — HYDROMORPHONE HYDROCHLORIDE PRN MG: 1 INJECTION, SOLUTION INTRAMUSCULAR; INTRAVENOUS; SUBCUTANEOUS at 23:26

## 2018-07-18 RX ADMIN — OXYCODONE HYDROCHLORIDE AND ACETAMINOPHEN PRN TAB: 5; 325 TABLET ORAL at 16:24

## 2018-07-18 RX ADMIN — METOPROLOL TARTRATE SCH: 25 TABLET, FILM COATED ORAL at 04:02

## 2018-07-18 RX ADMIN — INSULIN LISPRO SCH: 100 INJECTION, SOLUTION INTRAVENOUS; SUBCUTANEOUS at 11:58

## 2018-07-18 RX ADMIN — HYDROMORPHONE HYDROCHLORIDE PRN MG: 1 INJECTION, SOLUTION INTRAMUSCULAR; INTRAVENOUS; SUBCUTANEOUS at 04:05

## 2018-07-18 RX ADMIN — INSULIN LISPRO SCH: 100 INJECTION, SOLUTION INTRAVENOUS; SUBCUTANEOUS at 20:54

## 2018-07-18 RX ADMIN — HYDROMORPHONE HYDROCHLORIDE PRN MG: 1 INJECTION, SOLUTION INTRAMUSCULAR; INTRAVENOUS; SUBCUTANEOUS at 17:33

## 2018-07-18 RX ADMIN — INSULIN LISPRO SCH: 100 INJECTION, SOLUTION INTRAVENOUS; SUBCUTANEOUS at 16:35

## 2018-07-18 RX ADMIN — DOCUSATE SODIUM SCH MG: 100 CAPSULE, LIQUID FILLED ORAL at 08:28

## 2018-07-18 RX ADMIN — OXYCODONE HYDROCHLORIDE AND ACETAMINOPHEN PRN TAB: 5; 325 TABLET ORAL at 08:27

## 2018-07-18 RX ADMIN — INSULIN LISPRO SCH: 100 INJECTION, SOLUTION INTRAVENOUS; SUBCUTANEOUS at 08:21

## 2018-07-18 RX ADMIN — METOPROLOL SUCCINATE SCH MG: 50 TABLET, EXTENDED RELEASE ORAL at 08:28

## 2018-07-18 RX ADMIN — SODIUM CHLORIDE SCH MLS/HR: 900 IRRIGANT IRRIGATION at 08:46

## 2018-07-18 RX ADMIN — HYDROMORPHONE HYDROCHLORIDE PRN MG: 1 INJECTION, SOLUTION INTRAMUSCULAR; INTRAVENOUS; SUBCUTANEOUS at 14:13

## 2018-07-18 NOTE — PN
Progress Note





- Progress Note


Date of Service: 07/18/18


SOAP: 


Subjective:


[]Patient seen at bedside. She is eating breakfast. Denies any current RLE 

pain. She was moved to the tele unit last night due to rapid a-fib. 





Objective:


[] General: Lying in bed, more alert than previous. Answers questions 

appropriately


RLE: hip abducted, externally rotated. Thigh is soft, no obvious skin breakdown 

or ecchymosis. Lateral malleolus erythematous, wearing booties. Sensation 

intact distally to light touch. Capillary refill less than two seconds 

distally. 





Assessment:


[]Right femoral neck fx, likely pathologic due to presence of mets





Plan:


NWB RLE


If surgery is deemed an option for this patient she will likely need to 

transfer to a more appropriate facility to manage pathologic fracture





 Vital Signs











Temp  99.2 F   07/18/18 03:53


 


Pulse  80   07/18/18 03:53


 


Resp  18   07/18/18 08:27


 


BP  155/68   07/18/18 03:53


 


Pulse Ox  93   07/18/18 03:53








 Intake & Output











 07/17/18 07/18/18 07/18/18





 18:59 06:59 18:59


 


Intake Total 410 0 


 


Output Total 250 400 


 


Balance 160 -400 


 


Intake:   


 


  Oral 410 0 


 


Output:   


 


  Urine  175 


 


  Casey 250 225 


 


Other:   


 


  # Bowel Movements  0 








 Laboratory Last Values











WBC  10.2 10^3/ul (3.5-10.8)   07/18/18  06:16    


 


RBC  4.20 10^6/ul (4.00-5.40)   07/18/18  06:16    


 


Hgb  11.8 g/dl (12.0-16.0)  L  07/18/18  06:16    


 


Hct  36 % (35-47)   07/18/18  06:16    


 


MCV  85 fL (80-97)   07/18/18  06:16    


 


MCH  28 pg (27-31)   07/18/18  06:16    


 


MCHC  33 g/dl (31-36)   07/18/18  06:16    


 


RDW  16 % (10.5-15)  H  07/18/18  06:16    


 


Plt Count  349 10^3/ul (150-450)   07/18/18  06:16    


 


MPV  7.4 um3 (7.4-10.4)   07/18/18  06:16    


 


Neut % (Auto)  87.4 % (38-83)  H  07/18/18  06:16    


 


Lymph % (Auto)  5.9 % (25-47)  L  07/18/18  06:16    


 


Mono % (Auto)  5.7 % (0-7)   07/18/18  06:16    


 


Eos % (Auto)  0.5 % (0-6)   07/18/18  06:16    


 


Baso % (Auto)  0.5 % (0-2)   07/18/18  06:16    


 


Absolute Neuts (auto)  8.9 10^3/ul (1.5-7.7)  H  07/18/18  06:16    


 


Absolute Lymphs (auto)  0.6 10^3/ul (1.0-4.8)  L  07/18/18  06:16    


 


Absolute Monos (auto)  0.6 10^3/ul (0-0.8)   07/18/18  06:16    


 


Absolute Eos (auto)  0.1 10^3/ul (0-0.6)   07/18/18  06:16    


 


Absolute Basos (auto)  0 10^3/ul (0-0.2)   07/18/18  06:16    


 


Absolute Nucleated RBC  0 10^3/ul  07/18/18  06:16    


 


Nucleated RBC %  0.1   07/18/18  06:16    


 


INR (Anticoag Therapy)  0.94  (0.77-1.02)   07/12/18  10:50    


 


Sodium  143 mmol/L (135-145)   07/18/18  06:16    


 


Potassium  3.4 mmol/L (3.5-5.0)  L  07/18/18  06:16    


 


Chloride  105 mmol/L (101-111)   07/18/18  06:16    


 


Carbon Dioxide  32 mmol/L (22-32)   07/18/18  06:16    


 


Anion Gap  6 mmol/L (2-11)   07/18/18  06:16    


 


BUN  10 mg/dL (6-24)   07/18/18  06:16    


 


Creatinine  < 0.30 mg/dL (0.51-0.95)  L  07/18/18  06:16    


 


Est GFR ( Amer)  260.3  (>60)   07/18/18  06:16    


 


Est GFR (Non-Af Amer)  215.2  (>60)   07/18/18  06:16    


 


BUN/Creatinine Ratio  33.0  (8-20)  H  07/18/18  06:16    


 


Glucose  128 mg/dL ()  H  07/18/18  06:16    


 


POC Glucose (mg/dL)  127 mg/dL ()  H  07/18/18  08:08    


 


Calcium  7.9 mg/dL (8.6-10.3)  L  07/18/18  06:16    


 


Magnesium  2.0 mg/dL (1.9-2.7)   07/18/18  06:16    


 


Total Bilirubin  0.30 mg/dL (0.2-1.0)   07/17/18  06:27    


 


AST  22 U/L (13-39)   07/17/18  06:27    


 


ALT  16 U/L (7-52)   07/17/18  06:27    


 


Alkaline Phosphatase  343 U/L ()  H  07/17/18  06:27    


 


Total Protein  4.7 g/dL (6.4-8.9)  L  07/17/18  06:27    


 


Albumin  2.0 g/dL (3.2-5.2)  L  07/17/18  06:27    


 


Globulin  2.7 g/dL (2-4)   07/17/18  06:27    


 


Albumin/Globulin Ratio  0.7  (1-3)  L  07/17/18  06:27    


 


Urine Color  Rosalind   07/11/18  18:45    


 


Urine Appearance  Cloudy   07/11/18  18:45    


 


Urine pH  5.0  (5-9)   07/11/18  18:45    


 


Ur Specific Gravity  1.028  (1.010-1.030)   07/11/18  18:45    


 


Urine Protein  Negative  (Negative)   07/11/18  18:45    


 


Urine Ketones  Negative  (Negative)   07/11/18  18:45    


 


Urine Blood  Negative  (Negative)   07/11/18  18:45    


 


Urine Nitrate  Negative  (Negative)   07/11/18  18:45    


 


Urine Bilirubin  Negative  (Negative)   07/11/18  18:45    


 


Urine Urobilinogen  Negative  (Negative)   07/11/18  18:45    


 


Ur Leukocyte Esterase  Negative  (Negative)   07/11/18  18:45    


 


Urine Glucose  2+(150 mg/dl)  (Negative)  A  07/11/18  18:45    


 


Urine Ascorbic Acid  *  (Negative)  A  07/11/18  18:45    


 


Flow Intrp 2-8 Markers  Not Reportable   07/13/18  13:40    


 


Flow Intrp 9-15 Marker     07/13/18  13:40    


 


Flow Intrp 16+ Markers  Not Reportable   07/13/18  13:40

## 2018-07-18 NOTE — PN
Subjective


Date of Service: 18


Interval History: 





Patient more alert and talking loudly in a pressured manner. Usually 

nonsensical but able to answer questions appropriately occasionally. Denies pain

, palpitations, SOB. Converted to NSR yesterday evening after 1 dose 

metoprolol. 


Family History: Unchanged from Admission


Social History: Unchanged from Admission


Past Medical History: Unchanged from Admission





Objective


Active Medications: 








Acetaminophen (Tylenol Tab*)  650 mg PO Q4H PRN


   PRN Reason: FEVER/PAIN


   Last Admin: 18 22:15 Dose:  650 mg


Al Hydrox/Mg Hydrox/Simethicone (Maalox Plus*)  30 ml PO Q6H PRN


   PRN Reason: INDIGESTION


Cyclobenzaprine HCl (Flexeril Tab*)  5 mg PO TID PRN


   PRN Reason: SPASMS


   Last Admin: 18 22:14 Dose:  5 mg


Dextrose (D50w Syringe 50 Ml*)  12.5 gm IV PUSH .FOR FS < 60 - SS PRN


   PRN Reason: FS < 60


Diazepam (Valium Inj (Nf))  2 mg IV Q8H PRN


   PRN Reason: Spasm


   Last Admin: 18 15:32 Dose:  2 mg


Docusate Sodium (Colace Cap*)  100 mg PO BID [facility]


   Last Admin: 18 08:28 Dose:  100 mg


Hydromorphone HCl (Dilaudid Inj*)  1 mg IV Q3H PRN


   PRN Reason: PAIN


   Last Admin: 18 14:13 Dose:  1 mg


Sodium Chloride (Ns 0.9% 1000 Ml*)  1,000 mls @ 75 mls/hr IV PER RATE [facility]


   Last Admin: 18 08:46 Dose:  75 mls/hr


Insulin Human Lispro (Humalog*)  0 units SUBCUT ACHS [facility]; Protocol


   Last Admin: 18 16:35 Dose:  Not Given


Ipratropium Bromide (Atrovent 0.5 Mg Neb.Sol*)  0.5 mg INH Q4H PRN


   PRN Reason: SOB/WHEEZING


Magnesium Hydroxide (Milk Of Magnesia Liq*)  30 ml PO Q4H PRN


   PRN Reason: CONSTIPATION


   Last Admin: 07/15/18 20:45 Dose:  30 ml


Metoprolol Succinate (Toprol Xl Tab*)  50 mg PO DAILY [facility]


   Last Admin: 18 08:28 Dose:  50 mg


Ondansetron HCl (Zofran Inj*)  4 mg IV Q4H PRN


   PRN Reason: NAUSEA/VOMITING


Oxycodone/Acetaminophen (Percocet 5/325 Tab*)  1 tab PO Q4H PRN


   PRN Reason: Pain


   Last Admin: 18 16:24 Dose:  1 tab








 Vital Signs - 8 hr











  18





  10:00 11:13 14:13


 


Temperature  98.0 F 


 


Pulse Rate  64 


 


Respiratory 16 16 18





Rate   


 


Blood Pressure  124/51 





(mmHg)   


 


O2 Sat by Pulse  98 





Oximetry   














  18





  15:00 16:24


 


Temperature  


 


Pulse Rate  


 


Respiratory 16 8





Rate  


 


Blood Pressure  





(mmHg)  


 


O2 Sat by Pulse  





Oximetry  











Oxygen Devices in Use Now: Nasal Cannula


Appearance: Patient is a 79yo female who appears stated age and is sitting in 

the bed in NAD.


Eyes: No Scleral Icterus, PERRLA


Ears/Nose/Mouth/Throat: NL Teeth, Lips, Gums, Clear Oropharnyx, Mucous 

Membranes Moist


Neck: NL Appearance and Movements; NL JVP, Trachea Midline


Respiratory: Symmetrical Chest Expansion and Respiratory Effort, Clear to 

Auscultation


Cardiovascular: NL Sounds; No Murmurs; No JVD, RRR, No Edema


Abdominal: NL Sounds; No Tenderness; No Distention, No Hepatosplenomegaly


Lymphatic: No Cervical Adenopathy


Extremities: No Edema, No Clubbing, Cyanosis, - - Left leg painful to any 

manipulation. 


Skin: No Rash or Ulcers, No Nodules or Sclerosis


Neurological: - - CN II-XII intact. A/Ox1


Result Diagrams: 


 18 06:16





 18 06:16


Microbiology and Other Data: 


 Microbiology











 18 15:40 Nasal Screen MRSA (PCR) - Final





 Nasal    Mrsa Not Detected











Diagnostic Imaging: 





Patient Name:         ROXANA KNIGHT                                        

                        Medical Record#: [id number]


Ordering Physician: Juana Pfeiffer MD                                            

                        Acct.#: [id number]


:     1940         Age: 78   Sex: F                                   

                        Location: SURGICAL STAY UNIT


Exam Date: 18                                                       

                        ADM Status: ADM IN


Order Information:                         CT PELVIS W/O


Accession Number:                          [id number]


CPT:                                       [id number]


INDICATION: Traumatic fracture right hip.





COMPARISON:  Comparison is made with a prior x-ray study of the right femur and 

pelvis


from 2018.





TECHNIQUE: Contiguous axial sections were obtained through the pelvis without 

intravenous


or oral contrast. Images were reconstructed in the coronal and sagittal planes.





FINDINGS:  The bones appear osteoporotic. There is a subcapital right femoral 

neck


fracture. The fracture fragments are overriding. There is anterior displacement 

of the


distal fragment relative the proximal fragment and rotation of the femur. There 

is varus


angulation. There is nonspecific sclerotic change in the ischial tuberosity.  

There is


mild to moderate bilateral osteoarthritic change in the hips.





There is marked enlargement of the left psoas and iliacus muscles consistent 

with a mass,


bulky adenopathy or hematoma.





The visualized portion of the small bowel and colon appear nondistended. There 

is a


catheter within the urinary bladder.





No free intraperitoneal air or fluid is seen.





IMPRESSION:  


1. DISPLACED ANGULATED RIGHT SUBCAPITAL FEMORAL NECK FRACTURE.


2. SCLEROTIC LESION WITHIN THE RIGHT ISCHIAL TUBEROSITY.


3. ENLARGEMENT OF THE LEFT PSOAS AND ILIACUS MUSCLES MOST CONSISTENT WITH A MASS

, BULKY


ADENOPATHY OR HEMATOMA. RECOMMEND A CT OF THE CHEST, ABDOMEN AND PELVIS WITH 

CONTRAST FOR


FURTHER EVALUATION.





____________________________________________________________


<Electronically signed by Corey Apodaca MD in OV>  18


Dictated By: Corey Apodaca MD


Dictated Date/Time: 18


Transcribed Date/Time: 18


Copy to:





CTA HEAD and NECK Conclusion:


[facility] IMAGING


Patient Name:ROXANA KNIGHT                                          MR:

[id number]                                  : 1940














LYMPH NODES: There is biapical emphysematous change.





BONES AND SOFT TISSUES: No bone or soft tissue abnormalities are noted.








CTA HEAD:





INTRACRANIAL CIRCULATION: There is no aneurysm, vascular malformation, occlusion

, or


stenosis of the visualized intracranial circulation. The anterior communicating 

artery


complex is clear. Bilateral posterior communicating arteries are identified.





VENOUS CIRCULATION: The venous system is unremarkable.





PERFUSION: There is no obvious parenchymal perfusion deficit.





HEMORRHAGE/INFARCT: There is no hemorrhage or acute infarct.


MASSES/SHIFT: There is no mass or shift.


EXTRA-AXIAL SPACES: There are no extra-axial fluid collections.


SULCI AND VENTRICLES: The sulci and ventricles are normal in size and position 

for the


patient's stated age.





CEREBRUM: There is hypoattenuation of the periventricular and subcortical white 

matter. 


BRAINSTEM: There are no focal parenchymal abnormalities.


CEREBELLUM: There are no focal parenchymal abnormalities.





PARANASAL SINUSES: The paranasal sinuses are clear.


ORBITS: The orbits are unremarkable.


BONES AND SOFT TISSUE: Degenerative changes are noted of the spine. There are 

multiple


lobulated masses of the right shoulder musculature, and evaluated on the current


examination.





OTHER: There is no abnormal enhancement.








IMPRESSION: 


1.  NO INTERNAL CAROTID ARTERY STENOSIS BY NASCET CRITERIA.


2.  NO ANEURYSM, VASCULAR MALFORMATION, OCCLUSION, OR STENOSIS OF THE VISUALIZED


INTRACRANIAL CIRCULATION..


3.  ATHEROSCLEROSIS.


4.  PROXIMAL VESSEL ISCHEMIC CHANGE.


5.  NO ACUTE INTRACRANIAL PATHOLOGY.


6.  MULTIPLE LOBULATED MASSES OF THE MUSCULATURE OF THE RIGHT SHOULDER GIRDLE, 

EVALUATED


ON THE CURRENT EXAMINATION SUSPICIOUS FOR SARCOMA VERSUS METASTATIC DISEASE.





MRI BRAIN


Patient Name:         ROXANA KNIGHT                                        

                        Medical Record#: [id number]


Ordering Physician: Demetra Abdullahi NP                                   

                        Acct.#: [id number]


:     1940         Age: 78   Sex: F                                   

                        Location: SURGICAL STAY UNIT


Exam Date: 18 1507                                                       

                        ADM Status: ADM IN


Order Information:                         MRI BRAIN W/O


Accession Number:                          [id number]


CPT:                                       [id number]


Indication: Worsening confusion and weakness. Previous CVAs with residual RIGHT-

sided


weakness.





Comparison: CT and CTA head neck exams of the same date.





Technique: VendRxa 1.5 Hallie OZ357H with GEM suite.  MRI brain without 

contrast. 





Report: Diffusion series is negative for acute or subacute ischemia. 

Susceptibility series


is negative for stigmata of hemosiderin deposition to indicate previous 

hemorrhage.





Mild prominence of the cerebral sulci and cerebellar fissures collecting 

atrophy.


Proportional ventricular enlargement. Patent basal cisterns. Disproportionate 

mild volume


loss and T2 hyperintense ischemic gliosis at the LEFT middle cerebral artery 

distribution


of the frontal and parietal lobes consistent with sequela of previous infarct. 

Additional


few nonspecific T2 hyperintense foci within the cerebral white matter without 

mass effect.


No extra-axial fluid collection evident. Preserved major intracranial flow-

voids.


Unremarkable orbital contents.





No suspicious calvarial or skull base lesions evident. Mucous retention cyst or 

polyp at


the inferior LEFT maxillary sinus. Unremarkable scalp. 





IMPRESSION: 


#. Negative for stigmata of acute or subacute ischemia. 


#. Relative mild encephalomalacia related to old LEFT MCA distribution infarct 

involving


the posterior LEFT frontal lobe and parietal lobe. 


#. Mild diffuse atrophy and stigmata of chronic small vessel ischemic disease.





____________________________________________________________


<Electronically signed by Cody Thrasher MD in OV>  18 1632


Dictated By: Cody Thrasher MD


Dictated Date/Time: 18 1632


Transcribed Date/Time: 18 1623


Copy to:





CARDIAC ECHO





Conclusions


There is normal left ventricular systolic function.


The estimated ejection fraction is 50-55%. 


Global left ventricular wall motion and contractility are within normal


limits.


Mild concentric left ventricular hypertrophy is observed.


There is an E to A reversal in the mitral valve flow pattern suggestive


of diastolic dysfunction.


There is mild to moderate aortic regurgitation. 


There is mild to moderate mitral regurgitation. 


There is mild to moderate tricuspid regurgitation.


There is evidence of mild to moderate pulmonary hypertension.


There is no prior echocardiogram available to compare with at this time.








Assess/Plan/Problems-Billing


Assessment: 





This is a 78 year old female patient transferred from a nursing home for femur 

fracture 2/2 unwitnessed fall. PMHx sig for COPD, previous CVAs, DM and HTN 

that presents with a right sided displaced subcapital femoral neck fracture and 

multiple masses concerning for malignancy. 





- Patient Problems


(1) Neoplasm


Current Visit: Yes   Status: Acute   Code(s): D49.9 - NEOPLASM OF UNSPECIFIED 

BEHAVIOR OF UNSPECIFIED SITE   SNOMED Code(s): 73051453


   Comment: 


CTA of the neck visualized local mass in the muscularture of the right shouder


Given the mass in the psoas, this is highly suspicious for metastatic disease/

sarcoma? 


CT chest, abdomen and pelvis with contrast shows numerous masses. 


Appreciate oncology input. 


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 Does not explain other 

masses. 


FNA of shoulder pending tomorrow. 


Will likely have no curative treatment options if metastatic cancer confirmed. 

   





(2) COPD (chronic obstructive pulmonary disease)


Current Visit: Yes   Status: Acute   Code(s): J44.9 - CHRONIC OBSTRUCTIVE 

PULMONARY DISEASE, UNSPECIFIED   SNOMED Code(s): 92673141


   Comment: 


CXR with COPD changes


Allergy to albuterol per record


Continue supportive O2


No signs of exacerbation.    





(3) Atrial fibrillation


Current Visit: Yes   Status: Acute   Code(s): I48.91 - UNSPECIFIED ATRIAL 

FIBRILLATION   SNOMED Code(s): 50873271


   Comment: 


With RVR to the 140s. Converted to NSR this AM with metoprolol, Mag and fluids. 

Will not anticoagulate due to cancer and limited life expectancy.    





(4) Femoral neck fracture


Current Visit: Yes   Status: Acute   Code(s): S72.009A - FRACTURE OF UNSP PART 

OF NECK OF UNSP FEMUR, INIT   SNOMED Code(s): 9812367


   Comment: 


Unwitnessed fall, right side fracture


CT pelvis is showing psoas mass on the left/contralateral side to the fracture 

and a sclerotic lesion of the ischial tuberosity


Concern for pathologic fracture


Patient is NOT medically optimized for surgery. 


Discussed with daughter and she is not insistant on going forward with surgery


Will need to be transferred if surgery indicated. 


Does not appear to be main source of patient discomfort.    





(5) History of CVA (cerebrovascular accident)


Current Visit: Yes   Status: Acute   Code(s): Z86.73 - PRSNL HX OF TIA (TIA), 

AND CEREB INFRC W/O RESID DEFICITS   SNOMED Code(s): 627720383


   Comment: 


Per patient's daughter she is sometimes forgetful, however, she can usually 

converse and is appropriate.


Unclear cause of current AMS. No Mass or CVA on MRI. 


AMS stable from yesterday. Near baseline. 


   





(6) Fluid overload


Current Visit: Yes   Status: Acute   Code(s): E87.70 - FLUID OVERLOAD, 

UNSPECIFIED   SNOMED Code(s): 52201293


   Comment: 


JVD resolved. Persistent hypoxia, normal lung exam with no edema. Poor urine 

output.    





(7) Hypertension


Current Visit: Yes   Status: Acute   Code(s): I10 - ESSENTIAL (PRIMARY) 

HYPERTENSION   SNOMED Code(s): 38089128


   Comment: 


BP stable   





(8) DNR (do not resuscitate)


Current Visit: Yes   Status: Acute   Comment: 


MOLST filled out with daughter and she wants limited medical interventions.    


Status and Disposition: 





Remain inpatient.

## 2018-07-19 PROCEDURE — 0JBD3ZX EXCISION OF RIGHT UPPER ARM SUBCUTANEOUS TISSUE AND FASCIA, PERCUTANEOUS APPROACH, DIAGNOSTIC: ICD-10-PCS

## 2018-07-19 RX ADMIN — ACETAMINOPHEN SCH MG: 325 TABLET ORAL at 18:19

## 2018-07-19 RX ADMIN — HYDROMORPHONE HYDROCHLORIDE PRN MG: 1 INJECTION, SOLUTION INTRAMUSCULAR; INTRAVENOUS; SUBCUTANEOUS at 05:40

## 2018-07-19 RX ADMIN — DOCUSATE SODIUM SCH MG: 100 CAPSULE, LIQUID FILLED ORAL at 08:35

## 2018-07-19 RX ADMIN — POLYETHYLENE GLYCOL 3350 PRN GM: 17 POWDER, FOR SOLUTION ORAL at 13:08

## 2018-07-19 RX ADMIN — SODIUM CHLORIDE SCH MLS/HR: 900 IRRIGANT IRRIGATION at 22:29

## 2018-07-19 RX ADMIN — DOCUSATE SODIUM SCH MG: 100 CAPSULE, LIQUID FILLED ORAL at 20:27

## 2018-07-19 RX ADMIN — OXYCODONE HYDROCHLORIDE PRN MG: 5 CAPSULE ORAL at 20:28

## 2018-07-19 RX ADMIN — INSULIN LISPRO SCH UNIT: 100 INJECTION, SOLUTION INTRAVENOUS; SUBCUTANEOUS at 18:18

## 2018-07-19 RX ADMIN — CYCLOBENZAPRINE HYDROCHLORIDE PRN MG: 10 TABLET, FILM COATED ORAL at 20:27

## 2018-07-19 RX ADMIN — INSULIN LISPRO SCH: 100 INJECTION, SOLUTION INTRAVENOUS; SUBCUTANEOUS at 08:35

## 2018-07-19 RX ADMIN — METOPROLOL SUCCINATE SCH MG: 50 TABLET, EXTENDED RELEASE ORAL at 08:35

## 2018-07-19 RX ADMIN — SODIUM CHLORIDE SCH MLS/HR: 900 IRRIGANT IRRIGATION at 08:36

## 2018-07-19 RX ADMIN — INSULIN LISPRO SCH UNIT: 100 INJECTION, SOLUTION INTRAVENOUS; SUBCUTANEOUS at 20:47

## 2018-07-19 RX ADMIN — ACETAMINOPHEN SCH MG: 325 TABLET ORAL at 13:08

## 2018-07-19 RX ADMIN — HYDROMORPHONE HYDROCHLORIDE PRN MG: 1 INJECTION, SOLUTION INTRAMUSCULAR; INTRAVENOUS; SUBCUTANEOUS at 10:16

## 2018-07-19 RX ADMIN — INSULIN LISPRO SCH: 100 INJECTION, SOLUTION INTRAVENOUS; SUBCUTANEOUS at 11:55

## 2018-07-19 NOTE — RAD
Indication: Soft tissue mass at the RIGHT shoulder. Ultrasound requested to assess for

feasibility of ultrasound-guided biopsy.



Comparison: July 12, 2018 CT.. 



Technique: Limited ultrasound of the superior posterior aspect RIGHT shoulder performed.



Report: Positioning limited due to RIGHT femur fracture.



Lobular margined 5.4 3.4 x 4.0 cm mass involving the posterior RIGHT deltoid musculature

with adjacent satellite similar echogenicity nodules visualized corresponding with prior

CT finding. 



IMPRESSION: 

#. While positioning is difficult due to the RIGHT femoral neck fracture of the mass

lesion at the RIGHT shoulder is visible and amenable to ultrasound-guided fine-needle

aspiration.

## 2018-07-19 NOTE — PN
Subjective


Date of Service: 18


Interval History: 





Patient seen and examined at bedside. Denies fever, chills, shortness of breath

, chest discomfort, N/V/D. Pt has not had a BM since 7/10. Pt is complaining of 

left thigh pain. Pt with limited answers to questions, but able to converse 

with staff today. 





Pt's daughter would like to pursue the biopsy.





Tele: Sinus rhythm to sinus arrhythmia, rate 70-80's








Family History: Unchanged from Admission


Social History: Unchanged from Admission


Past Medical History: Unchanged from Admission





Objective


Active Medications: 





Acetaminophen (Tylenol Tab*)  650 mg PO Q4H PRN Reason: FEVER/PAIN


Al Hydrox/Mg Hydrox/Simethicone (Maalox Plus*)  30 ml PO Q6H PRN Reason: 

INDIGESTION


Cyclobenzaprine HCl (Flexeril Tab*)  5 mg PO TID PRN Reason: SPASMS


Dextrose (D50w Syringe 50 Ml*)  12.5 gm IV PUSH .FOR FS < 60 - SS PRN Reason: 

FS < 60


Diazepam (Valium Inj (Nf))  2 mg IV Q8H PRN Reason: Spasm


Docusate Sodium (Colace Cap*)  100 mg PO BID SHARIF


Hydromorphone HCl (Dilaudid Inj*)  1 mg IV Q3H PRN Reason: PAIN


Sodium Chloride (Ns 0.9% 1000 Ml*)  1,000 mls @ 75 mls/hr IV PER RATE [facility]


Insulin Human Lispro (Humalog*)  0 units SUBCUT ACHS SHARIF; Protocol


Ipratropium Bromide (Atrovent 0.5 Mg Neb.Sol*)  0.5 mg INH Q4H PRN Reason: SOB/

WHEEZING


Magnesium Hydroxide (Milk Of Magnesia Liq*)  30 ml PO Q4H PRN Reason: 

CONSTIPATION


Metoprolol Succinate (Toprol Xl Tab*)  50 mg PO DAILY SHARIF


Ondansetron HCl (Zofran Inj*)  4 mg IV Q4H PRN Reason: NAUSEA/VOMITING


Oxycodone/Acetaminophen (Percocet 5/325 Tab*)  1 tab PO Q4H PRN Reason: Pain





 Vital Signs - 8 hr











  18





  01:48 03:15 05:40


 


Temperature  98.2 F 


 


Pulse Rate  70 


 


Respiratory 16 16 24





Rate   


 


Blood Pressure  139/50 





(mmHg)   


 


O2 Sat by Pulse  99 





Oximetry   














  18





  06:30


 


Temperature 


 


Pulse Rate 


 


Respiratory 16





Rate 


 


Blood Pressure 





(mmHg) 


 


O2 Sat by Pulse 





Oximetry 











Oxygen Devices in Use Now: None


Appearance: NAD, sitting up in bed


Ears/Nose/Mouth/Throat: Mucous Membranes Moist


Respiratory: Symmetrical Chest Expansion and Respiratory Effort, Clear to 

Auscultation


Cardiovascular: NL Sounds; No Murmurs; No JVD, - - Heart rate with an 

occational irregular beat


Extremities: No Edema


Skin: No Rash or Ulcers


Neurological: - - Alert and Oriented to person, confused


Lines/Tubes/Other Access: Clean, Dry and Intact [place] - patent, Clean, Dry and 

Intact Peripheral IV - site benign


Nutrition: Taking PO's


Result Diagrams: 


 18 06:16





 18 06:16


Microbiology and Other Data: 


 Microbiology











 18 15:40 Nasal Screen MRSA (PCR) - Final





 Nasal    Mrsa Not Detected











Diagnostic Imaging: 





Patient Name:         ROXANA KNIGHT                                        

                        Medical Record#: [id number]


Ordering Physician: Juana Pfeiffer MD                                            

                        Acct.#: [id number]


:     1940         Age: 78   Sex: F                                   

                        Location: SURGICAL STAY UNIT


Exam Date: 18                                                       

                        ADM Status: ADM IN


Order Information:                         CT PELVIS W/O


Accession Number:                          [id number]


CPT:                                       [id number]


INDICATION: Traumatic fracture right hip.





COMPARISON:  Comparison is made with a prior x-ray study of the right femur and 

pelvis


from 2018.





TECHNIQUE: Contiguous axial sections were obtained through the pelvis without 

intravenous


or oral contrast. Images were reconstructed in the coronal and sagittal planes.





FINDINGS:  The bones appear osteoporotic. There is a subcapital right femoral 

neck


fracture. The fracture fragments are overriding. There is anterior displacement 

of the


distal fragment relative the proximal fragment and rotation of the femur. There 

is varus


angulation. There is nonspecific sclerotic change in the ischial tuberosity.  

There is


mild to moderate bilateral osteoarthritic change in the hips.





There is marked enlargement of the left psoas and iliacus muscles consistent 

with a mass,


bulky adenopathy or hematoma.





The visualized portion of the small bowel and colon appear nondistended. There 

is a


catheter within the urinary bladder.





No free intraperitoneal air or fluid is seen.





IMPRESSION:  


1. DISPLACED ANGULATED RIGHT SUBCAPITAL FEMORAL NECK FRACTURE.


2. SCLEROTIC LESION WITHIN THE RIGHT ISCHIAL TUBEROSITY.


3. ENLARGEMENT OF THE LEFT PSOAS AND ILIACUS MUSCLES MOST CONSISTENT WITH A MASS

, BULKY


ADENOPATHY OR HEMATOMA. RECOMMEND A CT OF THE CHEST, ABDOMEN AND PELVIS WITH 

CONTRAST FOR


FURTHER EVALUATION.





____________________________________________________________


<Electronically signed by Corey Apodaca MD in OV>  18


Dictated By: Corey Apodaca MD


Dictated Date/Time: 18


Transcribed Date/Time: 18


Copy to:





CTA HEAD and NECK Conclusion:


[facility] IMAGING


Patient Name:ROXANA KNIGHT                                          MR:

[id number]                                  : 1940














LYMPH NODES: There is biapical emphysematous change.





BONES AND SOFT TISSUES: No bone or soft tissue abnormalities are noted.








CTA HEAD:





INTRACRANIAL CIRCULATION: There is no aneurysm, vascular malformation, occlusion

, or


stenosis of the visualized intracranial circulation. The anterior communicating 

artery


complex is clear. Bilateral posterior communicating arteries are identified.





VENOUS CIRCULATION: The venous system is unremarkable.





PERFUSION: There is no obvious parenchymal perfusion deficit.





HEMORRHAGE/INFARCT: There is no hemorrhage or acute infarct.


MASSES/SHIFT: There is no mass or shift.


EXTRA-AXIAL SPACES: There are no extra-axial fluid collections.


SULCI AND VENTRICLES: The sulci and ventricles are normal in size and position 

for the


patient's stated age.





CEREBRUM: There is hypoattenuation of the periventricular and subcortical white 

matter. 


BRAINSTEM: There are no focal parenchymal abnormalities.


CEREBELLUM: There are no focal parenchymal abnormalities.





PARANASAL SINUSES: The paranasal sinuses are clear.


ORBITS: The orbits are unremarkable.


BONES AND SOFT TISSUE: Degenerative changes are noted of the spine. There are 

multiple


lobulated masses of the right shoulder musculature, and evaluated on the current


examination.





OTHER: There is no abnormal enhancement.








IMPRESSION: 


1.  NO INTERNAL CAROTID ARTERY STENOSIS BY NASCET CRITERIA.


2.  NO ANEURYSM, VASCULAR MALFORMATION, OCCLUSION, OR STENOSIS OF THE VISUALIZED


INTRACRANIAL CIRCULATION..


3.  ATHEROSCLEROSIS.


4.  PROXIMAL VESSEL ISCHEMIC CHANGE.


5.  NO ACUTE INTRACRANIAL PATHOLOGY.


6.  MULTIPLE LOBULATED MASSES OF THE MUSCULATURE OF THE RIGHT SHOULDER GIRDLE, 

EVALUATED


ON THE CURRENT EXAMINATION SUSPICIOUS FOR SARCOMA VERSUS METASTATIC DISEASE.





MRI BRAIN


Patient Name:         ROXANA KNIGHT                                        

                        Medical Record#: [id number]


Ordering Physician: Demetra Abdullahi NP                                   

                        Acct.#: [id number]


:     1940         Age: 78   Sex: F                                   

                        Location: SURGICAL STAY UNIT


Exam Date: 18 1507                                                       

                        ADM Status: ADM IN


Order Information:                         MRI BRAIN W/O


Accession Number:                          [id number]


CPT:                                       [id number]


Indication: Worsening confusion and weakness. Previous CVAs with residual RIGHT-

sided


weakness.





Comparison: CT and CTA head neck exams of the same date.





Technique: [employer] 1.5 Hallie [id number] with GEM suite.  MRI brain without 

contrast. 





Report: Diffusion series is negative for acute or subacute ischemia. 

Susceptibility series


is negative for stigmata of hemosiderin deposition to indicate previous 

hemorrhage.





Mild prominence of the cerebral sulci and cerebellar fissures collecting 

atrophy.


Proportional ventricular enlargement. Patent basal cisterns. Disproportionate 

mild volume


loss and T2 hyperintense ischemic gliosis at the LEFT middle cerebral artery 

distribution


of the frontal and parietal lobes consistent with sequela of previous infarct. 

Additional


few nonspecific T2 hyperintense foci within the cerebral white matter without 

mass effect.


No extra-axial fluid collection evident. Preserved major intracranial flow-

voids.


Unremarkable orbital contents.





No suspicious calvarial or skull base lesions evident. Mucous retention cyst or 

polyp at


the inferior LEFT maxillary sinus. Unremarkable scalp. 





IMPRESSION: 


#. Negative for stigmata of acute or subacute ischemia. 


#. Relative mild encephalomalacia related to old LEFT MCA distribution infarct 

involving


the posterior LEFT frontal lobe and parietal lobe. 


#. Mild diffuse atrophy and stigmata of chronic small vessel ischemic disease.





____________________________________________________________


<Electronically signed by Cody Thrasher MD in OV>  18 1632


Dictated By: Cody Thrasher MD


Dictated Date/Time: 18 1632


Transcribed Date/Time: 18 1623


Copy to:





CARDIAC ECHO





Conclusions


There is normal left ventricular systolic function.


The estimated ejection fraction is 50-55%. 


Global left ventricular wall motion and contractility are within normal


limits.


Mild concentric left ventricular hypertrophy is observed.


There is an E to A reversal in the mitral valve flow pattern suggestive


of diastolic dysfunction.


There is mild to moderate aortic regurgitation. 


There is mild to moderate mitral regurgitation. 


There is mild to moderate tricuspid regurgitation.


There is evidence of mild to moderate pulmonary hypertension.


There is no prior echocardiogram available to compare with at this time.








Assess/Plan/Problems-Billing


Assessment: 





Ms. Knight is a 78 year old female patient transferred from a nursing home 

for femur fracture 2/2 unwitnessed fall. PMHx sig for COPD, previous CVAs, DM 

and HTN that presents with a right sided displaced subcapital femoral neck 

fracture and multiple masses concerning for malignancy. 











- Patient Problems


(1) Neoplasm


Code(s): D49.9 - NEOPLASM OF UNSPECIFIED BEHAVIOR OF UNSPECIFIED SITE   SNOMED 

Code(s): 03725488


   Comment: 


- Given the mass in the psoas, this is highly suspicious for metastatic disease/

sarcoma 


- CTA of the neck visualized local mass in the muscularture of the right shouder


- CT chest, abdomen and pelvis with contrast shows numerous masses 


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 Does not explain other 

masses


- FNA of shoulder pending for today


- Oncology consult, appreciate input


- Will likely have no curative treatment options if metastatic cancer confirmed 

per Oncology   





(2) Femoral neck fracture


Code(s): S72.009A - FRACTURE OF UNSP PART OF NECK OF UNSP FEMUR, INIT   SNOMED 

Code(s): 8564957


   Comment: 


- Unwitnessed fall resulting in a right side fracture


- CT pelvis is showing psoas mass on the left/contralateral side to the 

fracture and a sclerotic lesion of the ischial tuberosity


- Concern for pathologic fracture


- Discussed with daughter and she would like to hold on surgery at this time, 

will need to be transferred if surgery indicated


- Continue pain management   





(3) Atrial fibrillation


Code(s): I48.91 - UNSPECIFIED ATRIAL FIBRILLATION   SNOMED Code(s): 98639387


   Comment: 


- With RVR to the 140s, now converted to NSR 


- Continue metoprolol


- No anticoagulation due to cancer and limited life expectancy


    





(4) Fluid overload


Code(s): E87.70 - FLUID OVERLOAD, UNSPECIFIED   SNOMED Code(s): 11378368


   Comment: 


- JVD resolved, persistent hypoxia


- Normal lung exam with no edema 


- Poor urine output   





(5) Electrolyte abnormality


Code(s): E87.8 - OTH DISORDERS OF ELECTROLYTE AND FLUID BALANCE, NEC   SNOMED 

Code(s): 951243140


   Comment: 


- Hypokalemia. Received replacement, will recheck labs in the AM


- Hypomagnesemia. Resolved after replacement   





(6) Constipation


Code(s): K59.00 - CONSTIPATION, UNSPECIFIED   SNOMED Code(s): 10450360


   Comment: 


- No BM since 


- Will start bowel regimen   





(7) COPD (chronic obstructive pulmonary disease)


Code(s): J44.9 - CHRONIC OBSTRUCTIVE PULMONARY DISEASE, UNSPECIFIED   SNOMED 

Code(s): 81267596


   Comment: 


- No signs of acute exacerbation at this time


- CXR with COPD changes


- Allergy to albuterol per record, continue supportive O2   





(8) Hypertension


Code(s): I10 - ESSENTIAL (PRIMARY) HYPERTENSION   SNOMED Code(s): 30771461


   Comment: 


- Normotensive, -150's


- Continue metoprolol


   





(9) Diabetes


Code(s): E11.9 - TYPE 2 DIABETES MELLITUS WITHOUT COMPLICATIONS   SNOMED Code(s)

: 42773950


   Comment: 


- Glucose 100-180's


- Continue Lispro SS   





(10) History of CVA (cerebrovascular accident)


Code(s): Z86.73 - PRSNL HX OF TIA (TIA), AND CEREB INFRC W/O RESID DEFICITS   

SNOMED Code(s): 629674013


   Comment: 


- AMS stable, near baseline


- Per patient's daughter she is sometimes forgetful, however, she can usually 

converse and is appropriate


- Unclear cause of current AMS. No Mass or CVA on MRI 


   





(11) DVT prophylaxis


Code(s): CEN6505 -    SNOMED Code(s): 450513380


   Comment: 


- SCDs   





(12) DNR (do not resuscitate)


Comment: 


MOLST filled out with daughter and she wants limited medical interventions.    


Status and Disposition: 





Inpatient. Plan to discharge back to [facility] once medically stable.

## 2018-07-20 RX ADMIN — ACETAMINOPHEN SCH MG: 325 TABLET ORAL at 17:40

## 2018-07-20 RX ADMIN — INSULIN LISPRO SCH: 100 INJECTION, SOLUTION INTRAVENOUS; SUBCUTANEOUS at 11:57

## 2018-07-20 RX ADMIN — HYDROMORPHONE HYDROCHLORIDE PRN MG: 1 INJECTION, SOLUTION INTRAMUSCULAR; INTRAVENOUS; SUBCUTANEOUS at 18:27

## 2018-07-20 RX ADMIN — METOPROLOL SUCCINATE SCH MG: 50 TABLET, EXTENDED RELEASE ORAL at 14:35

## 2018-07-20 RX ADMIN — POTASSIUM CHLORIDE SCH MLS/HR: 200 INJECTION, SOLUTION INTRAVENOUS at 13:29

## 2018-07-20 RX ADMIN — OXYCODONE HYDROCHLORIDE PRN MG: 5 CAPSULE ORAL at 23:44

## 2018-07-20 RX ADMIN — HYDROMORPHONE HYDROCHLORIDE PRN MG: 1 INJECTION, SOLUTION INTRAMUSCULAR; INTRAVENOUS; SUBCUTANEOUS at 11:50

## 2018-07-20 RX ADMIN — METOPROLOL SUCCINATE SCH: 50 TABLET, EXTENDED RELEASE ORAL at 10:19

## 2018-07-20 RX ADMIN — INSULIN LISPRO SCH: 100 INJECTION, SOLUTION INTRAVENOUS; SUBCUTANEOUS at 22:15

## 2018-07-20 RX ADMIN — ACETAMINOPHEN SCH MG: 325 TABLET ORAL at 00:22

## 2018-07-20 RX ADMIN — POTASSIUM CHLORIDE SCH MLS/HR: 200 INJECTION, SOLUTION INTRAVENOUS at 18:51

## 2018-07-20 RX ADMIN — DOCUSATE SODIUM SCH: 100 CAPSULE, LIQUID FILLED ORAL at 10:20

## 2018-07-20 RX ADMIN — INSULIN LISPRO SCH UNIT: 100 INJECTION, SOLUTION INTRAVENOUS; SUBCUTANEOUS at 17:44

## 2018-07-20 RX ADMIN — DOCUSATE SODIUM SCH MG: 100 CAPSULE, LIQUID FILLED ORAL at 23:43

## 2018-07-20 RX ADMIN — ACETAMINOPHEN SCH MG: 325 TABLET ORAL at 13:27

## 2018-07-20 RX ADMIN — INSULIN LISPRO SCH: 100 INJECTION, SOLUTION INTRAVENOUS; SUBCUTANEOUS at 08:17

## 2018-07-20 RX ADMIN — ACETAMINOPHEN SCH MG: 325 TABLET ORAL at 05:44

## 2018-07-20 RX ADMIN — HYDROMORPHONE HYDROCHLORIDE PRN MG: 1 INJECTION, SOLUTION INTRAMUSCULAR; INTRAVENOUS; SUBCUTANEOUS at 01:36

## 2018-07-20 RX ADMIN — ACETAMINOPHEN SCH MG: 325 TABLET ORAL at 23:43

## 2018-07-20 NOTE — RAD
INDICATION: Large nodular morphology mass involving the deltoid musculature of the RIGHT

shoulder. Recent biopsy of janis mass at the LEFT axilla remarkable for low-grade

lymphoma.



COMPARISON: July 12, 2018 CT and July 19, 2018 ultrasound.



Written informed consent obtained from the patient's daughter by Telephone. Timeout

performed.



PROCEDURE: Following routine aseptic preparation for the dorsal aspect of the shoulder the

soft tissues superficial to the lobular margined heterogeneously hypoechoic mass involving

the deltoid muscle were anesthetized with 5 mL 1% lidocaine. 3 fine-needle aspiration

passes with 25-gauge 1.5 inch needles were obtained. Samples deemed sufficient by

pathology. Post biopsy imaging is negative for appreciable hematoma or other complication.



Procedure reasonably tolerated with pain/discomfort related to positioning with known

RIGHT hip fracture.



IMPRESSION: 

#. Successful ultrasound-guided fine-needle aspiration soft tissue mass at the RIGHT

shoulder.

## 2018-07-20 NOTE — PN
Subjective


Date of Service: 18


Interval History: 





Patient seen and examined at bedside. Denies fever, chills, shortness of breath

, chest discomfort, N/V/D. Pt denies pain at this time. Pt is slightly 

lethargic but will open eyes and answer some questions. 





Per NSG staff Pt declined her metoprolol this AM.





Tele: Sinus stuart to Afib with RVR, rate 's. Pt noted to change into what 

appears to be afib ~ 1120 this AM.








Family History: Unchanged from Admission


Social History: Unchanged from Admission


Past Medical History: Unchanged from Admission





Objective


Active Medications: 





Acetaminophen (Tylenol Tab*)  975 mg PO Q6H SHARIF


Al Hydrox/Mg Hydrox/Simethicone (Maalox Plus*)  30 ml PO Q6H PRN Reason: 

INDIGESTION


Bisacodyl (Dulcolax Supp*)  10 mg MT DAILY PRN Reason: CONSTIPATION


Cyclobenzaprine HCl (Flexeril Tab*)  5 mg PO TID PRN Reason: SPASMS


Dextrose (D50w Syringe 50 Ml*)  12.5 gm IV PUSH .FOR FS < 60 - SS PRN Reason: 

FS < 60


Diazepam (Valium Inj (Nf))  2 mg IV Q8H PRN Reason: Spasm


Docusate Sodium (Colace Cap*)  100 mg PO BID SHARIF


Hydromorphone HCl (Dilaudid Inj*)  1 mg IV Q3H PRN Reason: PAIN - UNCONTROLLED


Sodium Chloride (Ns 0.9% 1000 Ml*)  1,000 mls @ 75 mls/hr IV PER RATE [facility]


Insulin Human Lispro (Humalog*)  0 units SUBCUT ACHS SHARIF; Protocol


Ipratropium Bromide (Atrovent 0.5 Mg Neb.Sol*)  0.5 mg INH Q4H PRN Reason: SOB/

WHEEZING


Magnesium Hydroxide (Milk Of Magnesia Liq*)  30 ml PO Q4H PRN Reason: 

CONSTIPATION


Metoprolol Succinate (Toprol Xl Tab*)  50 mg PO DAILY SHARIF


Ondansetron HCl (Zofran Inj*)  4 mg IV Q4H PRN Reason: NAUSEA/VOMITING


Oxycodone HCl (Roxycodone Tab*)  5 mg PO Q6H PRN Reason: PAIN - SEVERE


Polyethylene Glycol/Electrolytes (Miralax*)  17 gm PO DAILY PRN Reason: 

CONSTIPATION





 Vital Signs - 8 hr











  18





  04:04


 


Temperature 98.1 F


 


Pulse Rate 59


 


Respiratory 14





Rate 


 


Blood Pressure 127/48





(mmHg) 


 


O2 Sat by Pulse 93





Oximetry 











Oxygen Devices in Use Now: None


Appearance: NAD, laying in bed with eyes closed


Ears/Nose/Mouth/Throat: Mucous Membranes Moist


Respiratory: Symmetrical Chest Expansion and Respiratory Effort, Clear to 

Auscultation


Cardiovascular: NL Sounds; No Murmurs; No JVD, - - Heart rate irregular, tachy


Abdominal: NL Sounds; No Tenderness; No Distention


Extremities: No Edema


Skin: No Rash or Ulcers


Neurological: Alert and Oriented x 3, NL Muscle Strength and Tone


Lines/Tubes/Other Access: Clean, Dry and Intact Peripheral IV - site benign


Nutrition: Taking PO's


Result Diagrams: 


 18 06:16





 18 05:40


Microbiology and Other Data: 


 Microbiology











 18 15:40 Nasal Screen MRSA (PCR) - Final





 Nasal    Mrsa Not Detected











Diagnostic Imagin18 - CT PELVIS W/O 


IMPRESSION:  


1. DISPLACED ANGULATED RIGHT SUBCAPITAL FEMORAL NECK FRACTURE.


2. SCLEROTIC LESION WITHIN THE RIGHT ISCHIAL TUBEROSITY.


3. ENLARGEMENT OF THE LEFT PSOAS AND ILIACUS MUSCLES MOST CONSISTENT WITH A MASS

, BULKY


ADENOPATHY OR HEMATOMA. RECOMMEND A CT OF THE CHEST, ABDOMEN AND PELVIS WITH 

CONTRAST FOR


FURTHER EVALUATION.





18  1130 - CTA HEAD


IMPRESSION: 


1.  NO INTERNAL CAROTID ARTERY STENOSIS BY NASCET CRITERIA.


2.  NO ANEURYSM, VASCULAR MALFORMATION, OCCLUSION, OR STENOSIS OF THE VISUALIZED


INTRACRANIAL CIRCULATION..


3.  ATHEROSCLEROSIS.


4.  PROXIMAL VESSEL ISCHEMIC CHANGE.


5.  NO ACUTE INTRACRANIAL PATHOLOGY.


6.  MULTIPLE LOBULATED MASSES OF THE MUSCULATURE OF THE RIGHT SHOULDER GIRDLE, 

EVALUATED


ON THE CURRENT EXAMINATION SUSPICIOUS FOR SARCOMA VERSUS METASTATIC DISEASE.





18 9347  - MRI BRAIN W/O                                                 

                        


IMPRESSION: 


1. Negative for stigmata of acute or subacute ischemia. 


2. Relative mild encephalomalacia related to old LEFT MCA distribution infarct 

involving


the posterior LEFT frontal lobe and parietal lobe. 


3. Mild diffuse atrophy and stigmata of chronic small vessel ischemic disease.





18 - Transthoracic ECHO


Conclusions


There is normal left ventricular systolic function.


The estimated ejection fraction is 50-55%. 


Global left ventricular wall motion and contractility are within normal


limits.


Mild concentric left ventricular hypertrophy is observed.


There is an E to A reversal in the mitral valve flow pattern suggestive


of diastolic dysfunction.


There is mild to moderate aortic regurgitation. 


There is mild to moderate mitral regurgitation. 


There is mild to moderate tricuspid regurgitation.


There is evidence of mild to moderate pulmonary hypertension.


There is no prior echocardiogram available to compare with at this time.








Assess/Plan/Problems-Billing


Assessment: 





Ms. Lay is a 78 year old female patient transferred from a nursing home 

for femur fracture 2/2 unwitnessed fall. PMHx sig for COPD, previous CVAs, DM 

and HTN that presents with a right sided displaced subcapital femoral neck 

fracture and multiple masses concerning for malignancy. 











- Patient Problems


(1) Neoplasm


Code(s): D49.9 - NEOPLASM OF UNSPECIFIED BEHAVIOR OF UNSPECIFIED SITE   SNOMED 

Code(s): 32539890


   Comment: 


- Given the mass in the psoas, this is highly suspicious for metastatic disease/

sarcoma 


- CTA of the neck visualized local mass in the muscularture of the right shouder


- CT chest, abdomen and pelvis with contrast shows numerous masses 


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 Does not explain other 

masses


- FNA of shoulder pending for today


- Oncology consult, appreciate input


- Will likely have no curative treatment options if metastatic cancer confirmed 

per Oncology   





(2) Femoral neck fracture


Code(s): S72.009A - FRACTURE OF UNSP PART OF NECK OF UNSP FEMUR, INIT   SNOMED 

Code(s): 8248626


   Comment: 


- Unwitnessed fall resulting in a right side fracture


- CT pelvis is showing psoas mass on the left/contralateral side to the 

fracture and a sclerotic lesion of the ischial tuberosity


- Concern for pathologic fracture


- Discussed with daughter and she would like to hold on surgery at this time, 

will need to be transferred if surgery indicated


- Continue pain management   





(3) Atrial fibrillation


Code(s): I48.91 - UNSPECIFIED ATRIAL FIBRILLATION   SNOMED Code(s): 15493653


   Comment: 


- RVR to the 140s, after Pt declined AM metoprolol


- Will give a 1 time dose of IV metoprolol at this time


- Continue metoprolol


- No anticoagulation due to cancer and limited life expectancy


    





(4) Fluid overload


Code(s): E87.70 - FLUID OVERLOAD, UNSPECIFIED   SNOMED Code(s): 63470843


   Comment: 


- JVD resolved, persistent hypoxia


- Normal lung exam with no edema 


- Urine output decreased, but improving   





(5) Electrolyte abnormality


Code(s): E87.8 - OTH DISORDERS OF ELECTROLYTE AND FLUID BALANCE, NEC   SNOMED 

Code(s): 481245469


   Comment: 


- Hypokalemia. Resolved but 3.5 and with HX Afib will give additional 

replacement today


- Hypomagnesemia. Resolved after replacement   





(6) Constipation


Code(s): K59.00 - CONSTIPATION, UNSPECIFIED   SNOMED Code(s): 43098689


   Comment: 


- No BM since 


- Continue bowel regimen   





(7) COPD (chronic obstructive pulmonary disease)


Code(s): J44.9 - CHRONIC OBSTRUCTIVE PULMONARY DISEASE, UNSPECIFIED   SNOMED 

Code(s): 72621000


   Comment: 


- No signs of acute exacerbation at this time


- CXR with COPD changes


- Allergy to albuterol per record, continue supportive O2   





(8) Hypertension


Code(s): I10 - ESSENTIAL (PRIMARY) HYPERTENSION   SNOMED Code(s): 11966281


   Comment: 


- Normotensive, -140's


- Continue metoprolol


   





(9) Diabetes


Code(s): E11.9 - TYPE 2 DIABETES MELLITUS WITHOUT COMPLICATIONS   SNOMED Code(s)

: 53609871


   Comment: 


- Glucose 100-180's


- Continue Lispro SS   





(10) History of CVA (cerebrovascular accident)


Code(s): Z86.73 - PRSNL HX OF TIA (TIA), AND CEREB INFRC W/O RESID DEFICITS   

SNOMED Code(s): 235358611


   Comment: 


- AMS stable, near baseline


- Per patient's daughter she is sometimes forgetful, however, she can usually 

converse and is appropriate


- Unclear cause of current AMS. No Mass or CVA on MRI 


   





(11) DVT prophylaxis


Code(s): TKS2223 -    SNOMED Code(s): 123002633


   Comment: 


- SCDs   





(12) DNR (do not resuscitate)


Comment: 


MOLST filled out with daughter and she wants limited medical interventions.    


Status and Disposition: 





Inpatient. Plan to discharge back to [facility] once medically stable.

## 2018-07-20 NOTE — PN
Progress Note





- Progress Note


Date of Service: 07/20/18


SOAP: 


Subjective:


[Attempted to see patient, she was down with IR for FNA.  ]











Assessment/Plan:


[77 yo female with what appears to be metastatic disease.  L axillary FNA 

demonstrated low grade lymphoma which is unlikely to have caused the erosive 

mass in the R shoulder or pathologic fracture of the hip.  Unfortunately, FNA 

of R shoulder mass was delayed for unclear reasons earlier this week, but 

performed successfully today.  Pathology is pending and will help to determine 

ultimate disposition.]

## 2018-07-21 RX ADMIN — INSULIN LISPRO SCH: 100 INJECTION, SOLUTION INTRAVENOUS; SUBCUTANEOUS at 08:18

## 2018-07-21 RX ADMIN — METOPROLOL SUCCINATE SCH MG: 50 TABLET, EXTENDED RELEASE ORAL at 10:22

## 2018-07-21 RX ADMIN — DOCUSATE SODIUM SCH MG: 100 CAPSULE, LIQUID FILLED ORAL at 10:22

## 2018-07-21 RX ADMIN — ACETAMINOPHEN SCH: 325 TABLET ORAL at 06:20

## 2018-07-21 RX ADMIN — OXYCODONE HYDROCHLORIDE PRN MG: 5 CAPSULE ORAL at 19:06

## 2018-07-21 RX ADMIN — ACETAMINOPHEN SCH MG: 325 TABLET ORAL at 13:28

## 2018-07-21 RX ADMIN — POLYETHYLENE GLYCOL 3350 PRN GM: 17 POWDER, FOR SOLUTION ORAL at 17:02

## 2018-07-21 RX ADMIN — HYDROMORPHONE HYDROCHLORIDE PRN MG: 1 INJECTION, SOLUTION INTRAMUSCULAR; INTRAVENOUS; SUBCUTANEOUS at 09:16

## 2018-07-21 RX ADMIN — DOCUSATE SODIUM SCH MG: 100 CAPSULE, LIQUID FILLED ORAL at 20:56

## 2018-07-21 RX ADMIN — ACETAMINOPHEN SCH MG: 325 TABLET ORAL at 19:06

## 2018-07-21 RX ADMIN — INSULIN LISPRO SCH: 100 INJECTION, SOLUTION INTRAVENOUS; SUBCUTANEOUS at 16:45

## 2018-07-21 RX ADMIN — HYDROMORPHONE HYDROCHLORIDE PRN MG: 1 INJECTION, SOLUTION INTRAMUSCULAR; INTRAVENOUS; SUBCUTANEOUS at 14:59

## 2018-07-21 RX ADMIN — INSULIN LISPRO SCH UNIT: 100 INJECTION, SOLUTION INTRAVENOUS; SUBCUTANEOUS at 20:53

## 2018-07-21 RX ADMIN — HYDROMORPHONE HYDROCHLORIDE PRN MG: 1 INJECTION, SOLUTION INTRAMUSCULAR; INTRAVENOUS; SUBCUTANEOUS at 20:53

## 2018-07-21 RX ADMIN — MAGNESIUM HYDROXIDE PRN ML: 400 SUSPENSION ORAL at 10:24

## 2018-07-21 RX ADMIN — ACETAMINOPHEN SCH MG: 325 TABLET ORAL at 05:37

## 2018-07-21 RX ADMIN — INSULIN LISPRO SCH: 100 INJECTION, SOLUTION INTRAVENOUS; SUBCUTANEOUS at 12:21

## 2018-07-21 RX ADMIN — CYCLOBENZAPRINE HYDROCHLORIDE PRN MG: 10 TABLET, FILM COATED ORAL at 05:38

## 2018-07-21 NOTE — PN
Subjective


Date of Service: 18


Interval History: 





Patient seen and examined at bedside. Denies fever, chills, shortness of breath

, chest discomfort, N/V/D. Pt states that she has right LE pain, but it isn't 

bad if she isn't moving the leg. This morning she is more awake and interactive 

then she has been for several days. Pt agrees to take bowel medications today. 

Per NSG staff Pt is often refusing medications.





Tele: Sinus rhythm with PACs, rate 70's








Family History: Unchanged from Admission


Social History: Unchanged from Admission


Past Medical History: Unchanged from Admission





Objective


Active Medications: 





Acetaminophen (Tylenol Tab*)  975 mg PO Q6H SHARIF


Al Hydrox/Mg Hydrox/Simethicone (Maalox Plus*)  30 ml PO Q6H PRN Reason: 

INDIGESTION


Bisacodyl (Dulcolax Supp*)  10 mg [place] DAILY PRN Reason: CONSTIPATION


Cyclobenzaprine HCl (Flexeril Tab*)  5 mg PO TID PRN Reason: SPASMS


Dextrose (D50w Syringe 50 Ml*)  12.5 gm IV PUSH .FOR FS < 60 - SS PRN Reason: 

FS < 60


Docusate Sodium (Colace Cap*)  100 mg PO BID SHARIF


Hydromorphone HCl (Dilaudid Inj*)  1 mg IV Q3H PRN Reason: PAIN - UNCONTROLLED


Insulin Human Lispro (Humalog*)  0 units SUBCUT ACHS SHARIF; Protocol


Ipratropium Bromide (Atrovent 0.5 Mg Neb.Sol*)  0.5 mg INH Q4H PRN Reason: SOB/

WHEEZING


Magnesium Hydroxide (Milk Of Magnesia Liq*)  30 ml PO Q4H PRN Reason: 

CONSTIPATION


Metoprolol Succinate (Toprol Xl Tab*)  50 mg PO DAILY SHARIF


Ondansetron HCl (Zofran Inj*)  4 mg IV Q4H PRN Reason: NAUSEA/VOMITING


Oxycodone HCl (Roxycodone Tab*)  5 mg PO Q6H PRN Reason: PAIN - SEVERE


Polyethylene Glycol/Electrolytes (Miralax*)  17 gm PO DAILY PRN Reason: 

CONSTIPATION





 Vital Signs - 8 hr











  18





  03:48 03:52 08:00


 


Temperature  97.4 F 


 


Pulse Rate  67 


 


Respiratory 16 20 16





Rate   


 


Blood Pressure  132/51 





(mmHg)   


 


O2 Sat by Pulse  100 





Oximetry   














  18





  08:01 08:06 09:16


 


Temperature 98.7 F  


 


Pulse Rate 73 82 


 


Respiratory 16 14 16





Rate   


 


Blood Pressure 137/55  





(mmHg)   


 


O2 Sat by Pulse 93 91 





Oximetry   











Oxygen Devices in Use Now: None


Appearance: NAD, sitting up in bed


Ears/Nose/Mouth/Throat: Mucous Membranes Moist


Respiratory: Symmetrical Chest Expansion and Respiratory Effort, Clear to 

Auscultation


Cardiovascular: NL Sounds; No Murmurs; No JVD, RRR


Abdominal: NL Sounds; No Tenderness; No Distention


Extremities: No Edema


Skin: No Rash or Ulcers


Neurological: - - Alert and Oriented to Person


Lines/Tubes/Other Access: Clean, Dry and Intact [place] - patent, Clean, Dry and 

Intact Peripheral IV - site benign


Nutrition: Taking PO's


Result Diagrams: 


 18 06:16





 18 05:40


Microbiology and Other Data: 


 Microbiology











 18 15:40 Nasal Screen MRSA (PCR) - Final





 Nasal    Mrsa Not Detected











Diagnostic Imagin18 - CT PELVIS W/O 


IMPRESSION:  


1. DISPLACED ANGULATED RIGHT SUBCAPITAL FEMORAL NECK FRACTURE.


2. SCLEROTIC LESION WITHIN THE RIGHT ISCHIAL TUBEROSITY.


3. ENLARGEMENT OF THE LEFT PSOAS AND ILIACUS MUSCLES MOST CONSISTENT WITH A MASS

, BULKY


ADENOPATHY OR HEMATOMA. RECOMMEND A CT OF THE CHEST, ABDOMEN AND PELVIS WITH 

CONTRAST FOR


FURTHER EVALUATION.





18  1130 - CTA HEAD


IMPRESSION: 


1.  NO INTERNAL CAROTID ARTERY STENOSIS BY NASCET CRITERIA.


2.  NO ANEURYSM, VASCULAR MALFORMATION, OCCLUSION, OR STENOSIS OF THE VISUALIZED


INTRACRANIAL CIRCULATION..


3.  ATHEROSCLEROSIS.


4.  PROXIMAL VESSEL ISCHEMIC CHANGE.


5.  NO ACUTE INTRACRANIAL PATHOLOGY.


6.  MULTIPLE LOBULATED MASSES OF THE MUSCULATURE OF THE RIGHT SHOULDER GIRDLE, 

EVALUATED


ON THE CURRENT EXAMINATION SUSPICIOUS FOR SARCOMA VERSUS METASTATIC DISEASE.





18 7497  - MRI BRAIN W/O                                                 

                        


IMPRESSION: 


1. Negative for stigmata of acute or subacute ischemia. 


2. Relative mild encephalomalacia related to old LEFT MCA distribution infarct 

involving


the posterior LEFT frontal lobe and parietal lobe. 


3. Mild diffuse atrophy and stigmata of chronic small vessel ischemic disease.





18 - TRANSTHORACIC ECHO


Conclusions


There is normal left ventricular systolic function.


The estimated ejection fraction is 50-55%. 


Global left ventricular wall motion and contractility are within normal


limits.


Mild concentric left ventricular hypertrophy is observed.


There is an E to A reversal in the mitral valve flow pattern suggestive


of diastolic dysfunction.


There is mild to moderate aortic regurgitation. 


There is mild to moderate mitral regurgitation. 


There is mild to moderate tricuspid regurgitation.


There is evidence of mild to moderate pulmonary hypertension.


There is no prior echocardiogram available to compare with at this time.








Assess/Plan/Problems-Billing


Assessment: 





Ms. Lay is a 78 year old female patient transferred from a nursing home 

for femur fracture 2/2 unwitnessed fall. PMHx sig for COPD, previous CVAs, DM 

and HTN that presents with a right sided displaced subcapital femoral neck 

fracture and multiple masses concerning for malignancy. 











- Patient Problems


(1) Neoplasm


Code(s): D49.9 - NEOPLASM OF UNSPECIFIED BEHAVIOR OF UNSPECIFIED SITE   SNOMED 

Code(s): 89371904


   Comment: 


- Given the mass in the psoas, this is highly suspicious for metastatic disease/

sarcoma 


- CTA of the neck visualized local mass in the muscularture of the right shouder


- CT chest, abdomen and pelvis with contrast shows numerous masses 


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 Does not explain other 

masses


- FNA of shoulder, pathology pending


- Oncology consult, appreciate input


- Will likely have no curative treatment options if metastatic cancer confirmed 

per Oncology   





(2) Femoral neck fracture


Code(s): S72.009A - FRACTURE OF UNSP PART OF NECK OF UNSP FEMUR, INIT   SNOMED 

Code(s): 7927846


   Comment: 


- Unwitnessed fall resulting in a right side fracture


- CT pelvis is showing psoas mass on the left/contralateral side to the 

fracture and a sclerotic lesion of the ischial tuberosity


- Concern for pathologic fracture


- Discussed with daughter and she would like to hold on surgery at this time, 

will need to be transferred if surgery indicated


- Continue pain management   





(3) Atrial fibrillation


Code(s): I48.91 - UNSPECIFIED ATRIAL FIBRILLATION   SNOMED Code(s): 56622597


   Comment: 


- Episode of RVR yesterday, resolved after a 1 time dose of IV metoprolol and 

AM metoprolol


- Continue metoprolol


- No anticoagulation due to cancer and limited life expectancy


    





(4) Fluid overload


Code(s): E87.70 - FLUID OVERLOAD, UNSPECIFIED   SNOMED Code(s): 15714101


   Comment: 


- JVD resolved, persistent hypoxia


- Normal lung exam with no edema 


- Urine output decreased, but improving   





(5) Electrolyte abnormality


Code(s): E87.8 - OTH DISORDERS OF ELECTROLYTE AND FLUID BALANCE, NEC   SNOMED 

Code(s): 740951266


   Comment: 


- Hypokalemia. AM labs pending


- Hypomagnesemia. AM labs pending   





(6) Constipation


Code(s): K59.00 - CONSTIPATION, UNSPECIFIED   SNOMED Code(s): 11677696


   Comment: 


- No BM since 


- Continue bowel regimen   





(7) COPD (chronic obstructive pulmonary disease)


Code(s): J44.9 - CHRONIC OBSTRUCTIVE PULMONARY DISEASE, UNSPECIFIED   SNOMED 

Code(s): 69045663


   Comment: 


- With acute hypoxic respiratory failure, resolved


- No signs of acute exacerbation at this time


- CXR with COPD changes


- Allergy to albuterol per record, continue supplemental O2 PRN   





(8) Hypertension


Code(s): I10 - ESSENTIAL (PRIMARY) HYPERTENSION   SNOMED Code(s): 78595950


   Comment: 


- Normotensive, -130's


- Continue metoprolol


   





(9) Diabetes


Code(s): E11.9 - TYPE 2 DIABETES MELLITUS WITHOUT COMPLICATIONS   SNOMED Code(s)

: 06465717


   Comment: 


- Glucose 100-200's


- Continue Lispro SS   





(10) History of CVA (cerebrovascular accident)


Code(s): Z86.73 - PRSNL HX OF TIA (TIA), AND CEREB INFRC W/O RESID DEFICITS   

SNOMED Code(s): 939172204


   Comment: 


- AMS stable, near baseline


- Per patient's daughter she is sometimes forgetful, however, she can usually 

converse and is appropriate


- Unclear cause of current AMS. No Mass or CVA on MRI 


   





(11) DVT prophylaxis


Code(s): LAO1592 -    SNOMED Code(s): 120866499


   Comment: 


- SCDs   





(12) DNR (do not resuscitate)


Comment: 


MOLST filled out with daughter and she wants limited medical interventions.    


Status and Disposition: 





Inpatient. Plan to discharge back to [facility] once medically stable.

## 2018-07-22 LAB
BASOPHILS # BLD AUTO: 0.1 10^3/UL (ref 0–0.2)
EOSINOPHIL # BLD AUTO: 0.2 10^3/UL (ref 0–0.6)
HCT VFR BLD AUTO: 37 % (ref 35–47)
HGB BLD-MCNC: 11.9 G/DL (ref 12–16)
LYMPHOCYTES # BLD AUTO: 0.6 10^3/UL (ref 1–4.8)
MCH RBC QN AUTO: 28 PG (ref 27–31)
MCHC RBC AUTO-ENTMCNC: 32 G/DL (ref 31–36)
MCV RBC AUTO: 86 FL (ref 80–97)
MONOCYTES # BLD AUTO: 0.6 10^3/UL (ref 0–0.8)
NEUTROPHILS # BLD AUTO: 9.7 10^3/UL (ref 1.5–7.7)
NRBC # BLD AUTO: 0 10^3/UL
NRBC BLD QL AUTO: 0.1
PLATELET # BLD AUTO: 350 10^3/UL (ref 150–450)
RBC # BLD AUTO: 4.34 10^6/UL (ref 4–5.4)
WBC # BLD AUTO: 11.1 10^3/UL (ref 3.5–10.8)

## 2018-07-22 RX ADMIN — DOCUSATE SODIUM SCH MG: 100 CAPSULE, LIQUID FILLED ORAL at 19:52

## 2018-07-22 RX ADMIN — DOCUSATE SODIUM SCH MG: 100 CAPSULE, LIQUID FILLED ORAL at 10:36

## 2018-07-22 RX ADMIN — ACETAMINOPHEN SCH MG: 325 TABLET ORAL at 13:06

## 2018-07-22 RX ADMIN — OXYCODONE HYDROCHLORIDE PRN MG: 5 CAPSULE ORAL at 10:36

## 2018-07-22 RX ADMIN — METOPROLOL SUCCINATE SCH MG: 50 TABLET, EXTENDED RELEASE ORAL at 10:36

## 2018-07-22 RX ADMIN — HYDROMORPHONE HYDROCHLORIDE PRN MG: 1 INJECTION, SOLUTION INTRAMUSCULAR; INTRAVENOUS; SUBCUTANEOUS at 20:36

## 2018-07-22 RX ADMIN — ACETAMINOPHEN SCH MG: 325 TABLET ORAL at 00:30

## 2018-07-22 RX ADMIN — HYDROMORPHONE HYDROCHLORIDE PRN MG: 1 INJECTION, SOLUTION INTRAMUSCULAR; INTRAVENOUS; SUBCUTANEOUS at 03:40

## 2018-07-22 RX ADMIN — INSULIN LISPRO SCH: 100 INJECTION, SOLUTION INTRAVENOUS; SUBCUTANEOUS at 22:51

## 2018-07-22 RX ADMIN — ACETAMINOPHEN SCH MG: 325 TABLET ORAL at 19:50

## 2018-07-22 RX ADMIN — INSULIN LISPRO SCH: 100 INJECTION, SOLUTION INTRAVENOUS; SUBCUTANEOUS at 10:35

## 2018-07-22 RX ADMIN — INSULIN LISPRO SCH: 100 INJECTION, SOLUTION INTRAVENOUS; SUBCUTANEOUS at 18:49

## 2018-07-22 RX ADMIN — ACETAMINOPHEN SCH MG: 325 TABLET ORAL at 05:53

## 2018-07-22 RX ADMIN — CYCLOBENZAPRINE HYDROCHLORIDE PRN MG: 10 TABLET, FILM COATED ORAL at 10:36

## 2018-07-22 RX ADMIN — INSULIN LISPRO SCH: 100 INJECTION, SOLUTION INTRAVENOUS; SUBCUTANEOUS at 13:06

## 2018-07-22 NOTE — PN
Subjective


Date of Service: 18


Interval History: 





Patient seen and examined at bedside. Denies fever, chills, shortness of breath

, chest discomfort, N/V/D. Pt states that her right LE pain is improving and 

mostly controlled if her leg isn't moved. Pt is anxious to return home to 

[facility]. Pt states that he is able to wiggle her right toes, but states 

that she doesn't want to at this time.





Tele: Sinus rhythm, rate 60-80's. Pt noted to have an occasional sinus 

arrhythmia








Family History: Unchanged from Admission


Social History: Unchanged from Admission


Past Medical History: Unchanged from Admission





Objective


Active Medications: 





Acetaminophen (Tylenol Tab*)  975 mg PO Q6H SHARIF


Al Hydrox/Mg Hydrox/Simethicone (Maalox Plus*)  30 ml PO Q6H PRN Reason: 

INDIGESTION


Bisacodyl (Dulcolax Supp*)  10 mg [place] DAILY PRN Reason: CONSTIPATION


Cyclobenzaprine HCl (Flexeril Tab*)  5 mg PO TID PRN Reason: SPASMS


Dextrose (D50w Syringe 50 Ml*)  12.5 gm IV PUSH .FOR FS < 60 - SS PRN Reason: 

FS < 60


Docusate Sodium (Colace Cap*)  100 mg PO BID SHARIF


Hydromorphone HCl (Dilaudid Inj*)  1 mg IV Q3H PRN Reason: PAIN - UNCONTROLLED


Insulin Human Lispro (Humalog*)  0 units SUBCUT ACHS SHARIF; Protocol


Ipratropium Bromide (Atrovent 0.5 Mg Neb.Sol*)  0.5 mg INH Q4H PRN Reason: SOB/

WHEEZING


Magnesium Hydroxide (Milk Of Magnesia Liq*)  30 ml PO Q4H PRN Reason: 

CONSTIPATION


Metoprolol Succinate (Toprol Xl Tab*)  50 mg PO DAILY SHARIF


Ondansetron HCl (Zofran Inj*)  4 mg IV Q4H PRN Reason: NAUSEA/VOMITING


Oxycodone HCl (Roxycodone Tab*)  5 mg PO Q6H PRN Reason: PAIN - SEVERE


Polyethylene Glycol/Electrolytes (Miralax*)  17 gm PO DAILY PRN Reason: 

CONSTIPATION





 Vital Signs - 8 hr











  18





  02:20 03:40 03:45


 


Temperature   99.0 F


 


Pulse Rate 72  64


 


Respiratory 14 16 24





Rate   


 


Blood Pressure   127/52





(mmHg)   


 


O2 Sat by Pulse 94  95





Oximetry   














  18





  05:21


 


Temperature 


 


Pulse Rate 


 


Respiratory 18





Rate 


 


Blood Pressure 





(mmHg) 


 


O2 Sat by Pulse 





Oximetry 











Oxygen Devices in Use Now: None


Appearance: NAD, laying in bed


Ears/Nose/Mouth/Throat: Mucous Membranes Moist


Respiratory: Symmetrical Chest Expansion and Respiratory Effort, Clear to 

Auscultation


Cardiovascular: NL Sounds; No Murmurs; No JVD, RRR


Abdominal: NL Sounds; No Tenderness; No Distention


Extremities: No Edema


Skin: No Rash or Ulcers, - - Right LE bent up at hip and leg externally 

rotated. Right foot slightly cool to the touch


Neurological: - - Alert and Oriented to Person, confused.


Lines/Tubes/Other Access: Clean, Dry and Intact Peripheral IV - site benign


Nutrition: Taking PO's


Result Diagrams: 


 18 05:35





 18 05:35


Microbiology and Other Data: 


 Microbiology











 18 15:40 Nasal Screen MRSA (PCR) - Final





 Nasal    Mrsa Not Detected











Diagnostic Imagin18 - CT PELVIS W/O 


IMPRESSION:  


1. DISPLACED ANGULATED RIGHT SUBCAPITAL FEMORAL NECK FRACTURE.


2. SCLEROTIC LESION WITHIN THE RIGHT ISCHIAL TUBEROSITY.


3. ENLARGEMENT OF THE LEFT PSOAS AND ILIACUS MUSCLES MOST CONSISTENT WITH A MASS

, BULKY


ADENOPATHY OR HEMATOMA. RECOMMEND A CT OF THE CHEST, ABDOMEN AND PELVIS WITH 

CONTRAST FOR


FURTHER EVALUATION.





18  1130 - CTA HEAD


IMPRESSION: 


1.  NO INTERNAL CAROTID ARTERY STENOSIS BY NASCET CRITERIA.


2.  NO ANEURYSM, VASCULAR MALFORMATION, OCCLUSION, OR STENOSIS OF THE VISUALIZED


INTRACRANIAL CIRCULATION..


3.  ATHEROSCLEROSIS.


4.  PROXIMAL VESSEL ISCHEMIC CHANGE.


5.  NO ACUTE INTRACRANIAL PATHOLOGY.


6.  MULTIPLE LOBULATED MASSES OF THE MUSCULATURE OF THE RIGHT SHOULDER GIRDLE, 

EVALUATED


ON THE CURRENT EXAMINATION SUSPICIOUS FOR SARCOMA VERSUS METASTATIC DISEASE.





18 4097  - MRI BRAIN W/O                                                 

                        


IMPRESSION: 


1. Negative for stigmata of acute or subacute ischemia. 


2. Relative mild encephalomalacia related to old LEFT MCA distribution infarct 

involving


the posterior LEFT frontal lobe and parietal lobe. 


3. Mild diffuse atrophy and stigmata of chronic small vessel ischemic disease.





18 - TRANSTHORACIC ECHO


Conclusions


There is normal left ventricular systolic function.


The estimated ejection fraction is 50-55%. 


Global left ventricular wall motion and contractility are within normal


limits.


Mild concentric left ventricular hypertrophy is observed.


There is an E to A reversal in the mitral valve flow pattern suggestive


of diastolic dysfunction.


There is mild to moderate aortic regurgitation. 


There is mild to moderate mitral regurgitation. 


There is mild to moderate tricuspid regurgitation.


There is evidence of mild to moderate pulmonary hypertension.


There is no prior echocardiogram available to compare with at this time.








Assess/Plan/Problems-Billing


Assessment: 





Ms. Lay is a 78 year old female patient transferred from a nursing home 

for femur fracture 2/2 unwitnessed fall. PMHx sig for COPD, previous CVAs, DM 

and HTN that presents with a right sided displaced subcapital femoral neck 

fracture and multiple masses concerning for malignancy. 











- Patient Problems


(1) Neoplasm


Code(s): D49.9 - NEOPLASM OF UNSPECIFIED BEHAVIOR OF UNSPECIFIED SITE   SNOMED 

Code(s): 17464466


   Comment: 


- Given the mass in the psoas, this is highly suspicious for metastatic disease/

sarcoma 


- CTA of the neck visualized local mass in the muscularture of the right shouder


- CT chest, abdomen and pelvis with contrast shows numerous masses 


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 Does not explain other 

masses


- FNA of shoulder, pathology pending


- Oncology consult, appreciate input


- Will likely have no curative treatment options if metastatic cancer confirmed 

per Oncology   





(2) Femoral neck fracture


Code(s): S72.009A - FRACTURE OF UNSP PART OF NECK OF UNSP FEMUR, INIT   SNOMED 

Code(s): 7829692


   Comment: 


- Unwitnessed fall resulting in a right side fracture


- CT pelvis is showing psoas mass on the left/contralateral side to the 

fracture and a sclerotic lesion of the ischial tuberosity


- Concern for pathologic fracture


- Discussed with daughter and she would like to hold on surgery at this time, 

will need to be transferred if surgery indicated


- Continue pain management   





(3) Atrial fibrillation


Code(s): I48.91 - UNSPECIFIED ATRIAL FIBRILLATION   SNOMED Code(s): 02197775


   Comment: 


- Sinus rhythm at this time


- Continue metoprolol


- No anticoagulation due to cancer and limited life expectancy


    





(4) Fluid overload


Code(s): E87.70 - FLUID OVERLOAD, UNSPECIFIED   SNOMED Code(s): 84823412


   Comment: 


- JVD resolved, hypoxia resolved


- Normal lung exam with no edema    





(5) Electrolyte abnormality


Code(s): E87.8 - OTH DISORDERS OF ELECTROLYTE AND FLUID BALANCE, NEC   SNOMED 

Code(s): 271249476


   Comment: 


- Hypokalemia. AM labs pending


- Hypomagnesemia. AM labs pending   





(6) Constipation


Code(s): K59.00 - CONSTIPATION, UNSPECIFIED   SNOMED Code(s): 99186112


   Comment: 


- No BM since 


- Continue bowel regimen   





(7) COPD (chronic obstructive pulmonary disease)


Code(s): J44.9 - CHRONIC OBSTRUCTIVE PULMONARY DISEASE, UNSPECIFIED   SNOMED 

Code(s): 72806826


   Comment: 


- With acute hypoxic respiratory failure, resolved


- No signs of acute exacerbation at this time


- CXR with COPD changes


- Allergy to albuterol per record, continue supplemental O2 PRN   





(8) Hypertension


Code(s): I10 - ESSENTIAL (PRIMARY) HYPERTENSION   SNOMED Code(s): 47780444


   Comment: 


- Normotensive, -130's


- Continue metoprolol


   





(9) Diabetes


Code(s): E11.9 - TYPE 2 DIABETES MELLITUS WITHOUT COMPLICATIONS   SNOMED Code(s)

: 26419632


   Comment: 


- Glucose 's


- Continue Lispro SS   





(10) History of CVA (cerebrovascular accident)


Code(s): Z86.73 - PRSNL HX OF TIA (TIA), AND CEREB INFRC W/O RESID DEFICITS   

SNOMED Code(s): 217225524


   Comment: 


- AMS stable, near baseline


- Per patient's daughter she is sometimes forgetful, however, she can usually 

converse and is appropriate


- Unclear cause of current AMS. No Mass or CVA on MRI 


   





(11) DVT prophylaxis


Code(s): GYN1575 -    SNOMED Code(s): 160771992


   Comment: 


- SCDs   





(12) DNR (do not resuscitate)


Comment: 


MOLST filled out with daughter and she wants limited medical interventions.    


Status and Disposition: 





Inpatient. Plan to discharge back to [facility] once medically stable and 

Pathology report is back, possibly on Monday

## 2018-07-23 RX ADMIN — WATER SCH NOTE: 100 INJECTION, SOLUTION INTRAVENOUS at 18:48

## 2018-07-23 RX ADMIN — CYCLOBENZAPRINE HYDROCHLORIDE PRN MG: 10 TABLET, FILM COATED ORAL at 07:10

## 2018-07-23 RX ADMIN — ACETAMINOPHEN SCH MG: 325 TABLET ORAL at 06:38

## 2018-07-23 RX ADMIN — WATER SCH: 100 INJECTION, SOLUTION INTRAVENOUS at 16:16

## 2018-07-23 RX ADMIN — CYCLOBENZAPRINE HYDROCHLORIDE PRN MG: 10 TABLET, FILM COATED ORAL at 01:10

## 2018-07-23 RX ADMIN — HYDROMORPHONE HYDROCHLORIDE PRN MG: 1 INJECTION, SOLUTION INTRAMUSCULAR; INTRAVENOUS; SUBCUTANEOUS at 07:10

## 2018-07-23 RX ADMIN — OXYCODONE HYDROCHLORIDE SCH MG: 10 TABLET, FILM COATED, EXTENDED RELEASE ORAL at 21:26

## 2018-07-23 RX ADMIN — OXYCODONE HYDROCHLORIDE SCH: 10 TABLET, FILM COATED, EXTENDED RELEASE ORAL at 21:31

## 2018-07-23 RX ADMIN — INSULIN LISPRO SCH: 100 INJECTION, SOLUTION INTRAVENOUS; SUBCUTANEOUS at 11:35

## 2018-07-23 RX ADMIN — ACETAMINOPHEN SCH MG: 325 TABLET ORAL at 11:43

## 2018-07-23 RX ADMIN — METOPROLOL SUCCINATE SCH MG: 50 TABLET, EXTENDED RELEASE ORAL at 09:27

## 2018-07-23 RX ADMIN — ACETAMINOPHEN SCH MG: 325 TABLET ORAL at 17:50

## 2018-07-23 RX ADMIN — OXYCODONE HYDROCHLORIDE SCH MG: 10 TABLET, FILM COATED, EXTENDED RELEASE ORAL at 11:44

## 2018-07-23 RX ADMIN — HYDROMORPHONE HYDROCHLORIDE PRN MG: 1 INJECTION, SOLUTION INTRAMUSCULAR; INTRAVENOUS; SUBCUTANEOUS at 02:11

## 2018-07-23 RX ADMIN — INSULIN LISPRO SCH: 100 INJECTION, SOLUTION INTRAVENOUS; SUBCUTANEOUS at 09:20

## 2018-07-23 RX ADMIN — ACETAMINOPHEN SCH MG: 325 TABLET ORAL at 00:49

## 2018-07-23 RX ADMIN — DOCUSATE SODIUM SCH MG: 100 CAPSULE, LIQUID FILLED ORAL at 09:27

## 2018-07-23 NOTE — PN
Subjective


Date of Service: 18


Interval History: 





She denied pain but 10 minutes before had c/o pain and been medicated, then a 

few mintues after my visit she was moaning in pain.


Family History: Unchanged from Admission


Social History: Unchanged from Admission


Past Medical History: Unchanged from Admission





Objective


Active Medications: 








Acetaminophen (Tylenol Tab*)  975 mg PO Q6H [facility]


   Last Admin: 18 11:43 Dose:  975 mg


Al Hydrox/Mg Hydrox/Simethicone (Maalox Plus*)  30 ml PO Q6H PRN


   PRN Reason: INDIGESTION


   Last Admin: 18 17:03 Dose:  30 ml


Atropine Sulfate (Atropine 1% (Oral/Sl)*)  2 drop SL Q2H PRN


   PRN Reason: DISCOMFORT


Bisacodyl (Dulcolax Supp*)  10 mg [place] DAILY PRN


   PRN Reason: CONSTIPATION


Dextrose (D50w Syringe 50 Ml*)  12.5 gm IV PUSH .FOR FS < 60 - SS PRN


   PRN Reason: FS < 60


Fentanyl (Duragesic Patch 12 Mcg/Hr *)  12 mcg TRANSDERM Q72H [facility]


Hydromorphone HCl (Dilaudid Inj*)  1 mg IV Q3H PRN


   PRN Reason: PAIN - UNCONTROLLED


   Last Admin: 18 07:10 Dose:  1 mg


Ipratropium Bromide (Atrovent 0.5 Mg Neb.Sol*)  0.5 mg INH Q4H PRN


   PRN Reason: SOB/WHEEZING


Lorazepam (Ativan Tab(*))  0.5 mg SL Q4H PRN


   PRN Reason: ANXIETY


Metoprolol Succinate (Toprol Xl Tab*)  50 mg PO DAILY [facility]


   Last Admin: 18 09:27 Dose:  50 mg


Morphine Sulfate (Morphine Oral Concentrate*)  5 mg SL Q30M PRN


   PRN Reason: PAIN


Ondansetron HCl (Zofran Inj*)  4 mg IV Q4H PRN


   PRN Reason: NAUSEA/VOMITING


Oxycodone HCl (Oxycontin(*))  10 mg PO BID [facility]


   Last Admin: 18 11:44 Dose:  10 mg


Oxycodone HCl (Roxycodone Tab*)  5 mg PO Q3H PRN


   PRN Reason: PAIN








 Vital Signs - 8 hr











  18





  08:00 08:02 08:17


 


Temperature  97.9 F 


 


Pulse Rate  59 


 


Respiratory 18 15 





Rate   


 


Blood Pressure  96/48 108/60





(mmHg)   


 


O2 Sat by Pulse   





Oximetry   














  18





  09:20 11:44 12:42


 


Temperature   98.9 F


 


Pulse Rate   65


 


Respiratory 16 18 18





Rate   


 


Blood Pressure   135/52





(mmHg)   


 


O2 Sat by Pulse   97





Oximetry   














  18





  14:12


 


Temperature 


 


Pulse Rate 


 


Respiratory 18





Rate 


 


Blood Pressure 





(mmHg) 


 


O2 Sat by Pulse 





Oximetry 











Oxygen Devices in Use Now: None


Appearance: Lethargic, responds slowly.  Neutral affect.  At times looks very 

comfortable, at times moaning or screaming in pain.


Neurological: - - She responds to voice but can't answer any questions 

reliably.  She cannot state her own first name or identify her daughter at her 

bedside.   No tremor. 


Result Diagrams: 


 18 05:35





 18 05:35


Microbiology and Other Data: 


 Microbiology











 18 15:40 Nasal Screen MRSA (PCR) - Final





 Nasal    Mrsa Not Detected











Diagnostic Imagin18 - CT PELVIS W/O 


IMPRESSION:  


1. DISPLACED ANGULATED RIGHT SUBCAPITAL FEMORAL NECK FRACTURE.


2. SCLEROTIC LESION WITHIN THE RIGHT ISCHIAL TUBEROSITY.


3. ENLARGEMENT OF THE LEFT PSOAS AND ILIACUS MUSCLES MOST CONSISTENT WITH A MASS

, BULKY


ADENOPATHY OR HEMATOMA. RECOMMEND A CT OF THE CHEST, ABDOMEN AND PELVIS WITH 

CONTRAST FOR


FURTHER EVALUATION.





18  1130 - CTA HEAD


IMPRESSION: 


1.  NO INTERNAL CAROTID ARTERY STENOSIS BY NASCET CRITERIA.


2.  NO ANEURYSM, VASCULAR MALFORMATION, OCCLUSION, OR STENOSIS OF THE VISUALIZED


INTRACRANIAL CIRCULATION..


3.  ATHEROSCLEROSIS.


4.  PROXIMAL VESSEL ISCHEMIC CHANGE.


5.  NO ACUTE INTRACRANIAL PATHOLOGY.


6.  MULTIPLE LOBULATED MASSES OF THE MUSCULATURE OF THE RIGHT SHOULDER GIRDLE, 

EVALUATED


ON THE CURRENT EXAMINATION SUSPICIOUS FOR SARCOMA VERSUS METASTATIC DISEASE.





18 7417  - MRI BRAIN W/O                                                 

                        


IMPRESSION: 


1. Negative for stigmata of acute or subacute ischemia. 


2. Relative mild encephalomalacia related to old LEFT MCA distribution infarct 

involving


the posterior LEFT frontal lobe and parietal lobe. 


3. Mild diffuse atrophy and stigmata of chronic small vessel ischemic disease.





18 - TRANSTHORACIC ECHO


Conclusions


There is normal left ventricular systolic function.


The estimated ejection fraction is 50-55%. 


Global left ventricular wall motion and contractility are within normal


limits.


Mild concentric left ventricular hypertrophy is observed.


There is an E to A reversal in the mitral valve flow pattern suggestive


of diastolic dysfunction.


There is mild to moderate aortic regurgitation. 


There is mild to moderate mitral regurgitation. 


There is mild to moderate tricuspid regurgitation.


There is evidence of mild to moderate pulmonary hypertension.


There is no prior echocardiogram available to compare with at this time.








Assess/Plan/Problems-Billing


Assessment: 





Ms. Lay is a 78 year old female patient transferred from a nursing home 

for femur fracture 2/2 unwitnessed fall. PMHx sig for COPD, previous CVAs, DM 

and HTN that presents with a right sided displaced subcapital femoral neck 

fracture and multiple masses concerning for malignancy. 











- Patient Problems


(1) Lymphoma


Current Visit: Yes   Status: Acute   Comment: Second bx consistent with first bx

:  low grade follicular lymphoma.  Large burden of disease in retroperitoneal 

space.  Very poor performance status, likely will continue to deteriorate due 

to her prgressive dementia.  Note albumin of 2.0.  Discussed with Dr. Bishop.  

Appropriate for Hospice.  I spoke at [facility] with the roscoe who is the HCP.  

She is very agreeable to Hospice care.    


Status and Disposition: 





Inpatient. Plan to discharge back to [facility] .

## 2018-07-24 VITALS — SYSTOLIC BLOOD PRESSURE: 138 MMHG | DIASTOLIC BLOOD PRESSURE: 54 MMHG

## 2018-07-24 RX ADMIN — OXYCODONE HYDROCHLORIDE SCH MG: 10 TABLET, FILM COATED, EXTENDED RELEASE ORAL at 07:15

## 2018-07-24 RX ADMIN — MORPHINE SULFATE PRN MG: 100 SOLUTION ORAL at 11:31

## 2018-07-24 RX ADMIN — MORPHINE SULFATE PRN MG: 100 SOLUTION ORAL at 03:46

## 2018-07-24 RX ADMIN — ACETAMINOPHEN SCH: 325 TABLET ORAL at 06:06

## 2018-07-24 RX ADMIN — ACETAMINOPHEN SCH MG: 325 TABLET ORAL at 07:15

## 2018-07-24 RX ADMIN — LORAZEPAM PRN MG: 0.5 TABLET ORAL at 08:32

## 2018-07-24 RX ADMIN — WATER SCH NOTE: 100 INJECTION, SOLUTION INTRAVENOUS at 06:41

## 2018-07-24 RX ADMIN — METOPROLOL SUCCINATE SCH MG: 50 TABLET, EXTENDED RELEASE ORAL at 08:33

## 2018-07-24 RX ADMIN — ACETAMINOPHEN SCH: 325 TABLET ORAL at 00:00

## 2018-07-24 RX ADMIN — MORPHINE SULFATE PRN MG: 100 SOLUTION ORAL at 07:15

## 2018-07-24 RX ADMIN — LORAZEPAM PRN MG: 0.5 TABLET ORAL at 04:08

## 2018-07-24 NOTE — TRS
CC: Dr. Héctor Dumas

 

DATE OF ADMISSION:  07/11/2018.

 

DATE OF TRANSFER:  07/24/2018.

 

HISTORY:  This 78-year-old woman presented with right hip pain.  She is a resident of [facility].  She sustained a fall on the day of transfer here.  She had had multiple falls
 before.  She was found to have a right hip fracture.  There was some concern it might be a pathologi
c fracture.  CT scan of the abdomen and pelvis showed extensive retroperitoneal lymphadenopathy.  She
 had a biopsy of the left axillary lymph node and also of a mass near her right scapula.  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 
biopsies showed low grade follicular lymphoma.  In view of her advanced dementia and entering the hitesh
ght loss stage, the family elected for comfort care.  This was discussed with the oncologist.

 

She was given some Oxycodone slow release; however, as she is going on hospice care, I think the oral
 route will not be reliable.  She was started on a Fentanyl patch which was increased to 25 mcg per h
our on the day of transfer.  This can be titrated up as needed, although she is a little difficult to
 evaluate in terms of pain.  She is really not able to communicate at all well.

 

FINAL DIAGNOSES:

1.  Right hip fracture.

2.  Dementia.

3.  Lymphoma.

 

TRANSFER MEDICATIONS:

1.  Atropine 1% two drops sublingual every 2 hours prn.

2.  Fentanyl patch 25 mcg per hour, change every 72 hours.

3.  Ipratropium 0.5 mg by nebulizer every 4 hours prn.

4.  Lorazepam 0.5 mg sublingual every 4 hours prn.

5.  Metoprolol Succinate 50 mg daily.

6.  Morphine oral concentrate 5 mg sublingual every 30 minutes prn.

 

 [id number] #: [id number]

## 2018-07-24 NOTE — PN
Progress Note





- Progress Note


Date of Service: 07/24/18


Note: 





Time spent on discharge including exam of patient, discussion with daughter, 

nurse, CM, preparation of discharge documents 35 minutes.